# Patient Record
Sex: FEMALE | Race: BLACK OR AFRICAN AMERICAN | Employment: FULL TIME | URBAN - METROPOLITAN AREA
[De-identification: names, ages, dates, MRNs, and addresses within clinical notes are randomized per-mention and may not be internally consistent; named-entity substitution may affect disease eponyms.]

---

## 2017-01-01 ENCOUNTER — HOSPITAL ENCOUNTER (OUTPATIENT)
Dept: ONCOLOGY | Age: 42
Discharge: HOME | End: 2017-12-04

## 2017-01-01 ENCOUNTER — OFFICE VISIT (OUTPATIENT)
Dept: ONCOLOGY | Age: 42
End: 2017-01-01

## 2017-01-01 ENCOUNTER — TELEPHONE (OUTPATIENT)
Dept: ONCOLOGY | Age: 42
End: 2017-01-01

## 2017-01-01 ENCOUNTER — HOSPITAL ENCOUNTER (OUTPATIENT)
Dept: LAB | Age: 42
Discharge: HOME | End: 2017-12-04
Payer: COMMERCIAL

## 2017-01-01 ENCOUNTER — DOCUMENTATION ONLY (OUTPATIENT)
Dept: ONCOLOGY | Age: 42
End: 2017-01-01

## 2017-01-01 VITALS
WEIGHT: 246.2 LBS | HEART RATE: 110 BPM | BODY MASS INDEX: 39.74 KG/M2 | SYSTOLIC BLOOD PRESSURE: 144 MMHG | DIASTOLIC BLOOD PRESSURE: 83 MMHG | TEMPERATURE: 97.8 F

## 2017-01-01 DIAGNOSIS — C50.211 MALIGNANT NEOPLASM OF UPPER-INNER QUADRANT OF RIGHT BREAST IN FEMALE, ESTROGEN RECEPTOR POSITIVE (HCC): ICD-10-CM

## 2017-01-01 DIAGNOSIS — D64.81 ANTINEOPLASTIC CHEMOTHERAPY INDUCED ANEMIA: ICD-10-CM

## 2017-01-01 DIAGNOSIS — T45.1X5A ANTINEOPLASTIC CHEMOTHERAPY INDUCED ANEMIA: ICD-10-CM

## 2017-01-01 DIAGNOSIS — T45.1X5A ANTINEOPLASTIC CHEMOTHERAPY INDUCED ANEMIA: Primary | ICD-10-CM

## 2017-01-01 DIAGNOSIS — Z17.0 MALIGNANT NEOPLASM OF UPPER-INNER QUADRANT OF RIGHT BREAST IN FEMALE, ESTROGEN RECEPTOR POSITIVE (HCC): ICD-10-CM

## 2017-01-01 DIAGNOSIS — D64.81 ANTINEOPLASTIC CHEMOTHERAPY INDUCED ANEMIA: Primary | ICD-10-CM

## 2017-01-01 LAB
ALBUMIN SERPL-MCNC: 3.8 G/DL (ref 3.4–5)
ALBUMIN/GLOB SERPL: 0.7 {RATIO} (ref 0.8–1.7)
ALP SERPL-CCNC: 101 U/L (ref 45–117)
ALT SERPL-CCNC: 20 U/L (ref 13–56)
ANION GAP SERPL CALC-SCNC: 15 MMOL/L (ref 3–18)
AST SERPL-CCNC: 32 U/L (ref 15–37)
BASO+EOS+MONOS # BLD AUTO: 0.4 K/UL (ref 0–2.3)
BASO+EOS+MONOS # BLD AUTO: 5 % (ref 0.1–17)
BILIRUB SERPL-MCNC: 0.4 MG/DL (ref 0.2–1)
BUN SERPL-MCNC: 13 MG/DL (ref 7–18)
BUN/CREAT SERPL: 12 (ref 12–20)
CALCIUM SERPL-MCNC: 9.7 MG/DL (ref 8.5–10.1)
CHLORIDE SERPL-SCNC: 102 MMOL/L (ref 100–108)
CO2 SERPL-SCNC: 22 MMOL/L (ref 21–32)
CREAT SERPL-MCNC: 1.05 MG/DL (ref 0.6–1.3)
DIFFERENTIAL METHOD BLD: ABNORMAL
ERYTHROCYTE [DISTWIDTH] IN BLOOD BY AUTOMATED COUNT: 13.6 % (ref 11.5–14.5)
FERRITIN SERPL-MCNC: 147 NG/ML (ref 8–388)
GLOBULIN SER CALC-MCNC: 5.2 G/DL (ref 2–4)
GLUCOSE SERPL-MCNC: 56 MG/DL (ref 74–99)
HCT VFR BLD AUTO: 45.2 % (ref 36–48)
HGB BLD-MCNC: 15 G/DL (ref 12–16)
IRON SATN MFR SERPL: 12 %
IRON SERPL-MCNC: 45 UG/DL (ref 50–175)
LYMPHOCYTES # BLD: 3.5 K/UL (ref 1.1–5.9)
LYMPHOCYTES NFR BLD: 42 % (ref 14–44)
MCH RBC QN AUTO: 27.7 PG (ref 25–35)
MCHC RBC AUTO-ENTMCNC: 33.2 G/DL (ref 31–37)
MCV RBC AUTO: 83.4 FL (ref 78–102)
NEUTS SEG # BLD: 4.5 K/UL (ref 1.8–9.5)
NEUTS SEG NFR BLD: 54 % (ref 40–70)
PLATELET # BLD AUTO: 198 K/UL (ref 140–440)
POTASSIUM SERPL-SCNC: 3.7 MMOL/L (ref 3.5–5.5)
PROT SERPL-MCNC: 9 G/DL (ref 6.4–8.2)
RBC # BLD AUTO: 5.42 M/UL (ref 4.1–5.1)
SODIUM SERPL-SCNC: 139 MMOL/L (ref 136–145)
TIBC SERPL-MCNC: 390 UG/DL (ref 250–450)
WBC # BLD AUTO: 8.4 K/UL (ref 4.5–13)

## 2017-01-01 PROCEDURE — 80053 COMPREHEN METABOLIC PANEL: CPT | Performed by: NURSE PRACTITIONER

## 2017-01-01 PROCEDURE — 82728 ASSAY OF FERRITIN: CPT | Performed by: NURSE PRACTITIONER

## 2017-01-01 PROCEDURE — 83540 ASSAY OF IRON: CPT | Performed by: NURSE PRACTITIONER

## 2017-01-01 PROCEDURE — 36415 COLL VENOUS BLD VENIPUNCTURE: CPT | Performed by: NURSE PRACTITIONER

## 2017-12-04 PROBLEM — E66.01 OBESITY, MORBID (HCC): Status: ACTIVE | Noted: 2017-01-01

## 2017-12-04 NOTE — PATIENT INSTRUCTIONS
Breast Cancer: Care Instructions  Your Care Instructions    Breast cancer occurs when abnormal cells grow out of control in the breast. These cancer cells can spread within the breast, to nearby lymph nodes and other tissues, and to other parts of the body. Being treated for cancer can weaken your body, and you may feel very tired. Get the rest your body needs so you can feel better. Finding out that you have cancer is scary. You may feel many emotions and may need some help coping. Seek out family, friends, and counselors for support. You also can do things at home to make yourself feel better while you go through treatment. Call the [employer] (8-570.107.2228) or visit its website at [employer] for more information. Follow-up care is a key part of your treatment and safety. Be sure to make and go to all appointments, and call your doctor if you are having problems. It's also a good idea to know your test results and keep a list of the medicines you take. How can you care for yourself at home? · Take your medicines exactly as prescribed. Call your doctor if you think you are having a problem with your medicine. You may get medicine for nausea and vomiting if you have these side effects. · Follow your doctor's instructions to relieve pain. Pain from cancer and surgery can almost always be controlled. Use pain medicine when you first notice pain, before it becomes severe. · Eat healthy food. If you do not feel like eating, try to eat food that has protein and extra calories to keep up your strength and prevent weight loss. Drink liquid meal replacements for extra calories and protein. Try to eat your main meal early. · Get some physical activity every day, but do not get too tired. Keep doing the hobbies you enjoy as your energy allows. · Do not smoke. Smoking can make your cancer worse. If you need help quitting, talk to your doctor about stop-smoking programs and medicines.  These can increase your chances of quitting for good. · Take steps to control your stress and workload. Learn relaxation techniques. ¨ Share your feelings. Stress and tension affect our emotions. By expressing your feelings to others, you may be able to understand and cope with them. ¨ Consider joining a support group. Talking about a problem with your spouse, a good friend, or other people with similar problems is a good way to reduce tension and stress. ¨ Express yourself through art. Try writing, crafts, dance, or art to relieve stress. Some dance, writing, or art groups may be available just for people who have cancer. ¨ Be kind to your body and mind. Getting enough sleep, eating a healthy diet, and taking time to do things you enjoy can contribute to an overall feeling of balance in your life and can help reduce stress. ¨ Get help if you need it. Discuss your concerns with your doctor or counselor. · If you are vomiting or have diarrhea:  ¨ Drink plenty of fluids (enough so that your urine is light yellow or clear like water) to prevent dehydration. Choose water and other caffeine-free clear liquids. If you have kidney, heart, or liver disease and have to limit fluids, talk with your doctor before you increase the amount of fluids you drink. ¨ When you are able to eat, try clear soups, mild foods, and liquids until all symptoms are gone for 12 to 48 hours. Other good choices include dry toast, crackers, cooked cereal, and gelatin dessert, such as Jell-O.  · If you have not already done so, prepare a list of advance directives. Advance directives are instructions to your doctor and family members about what kind of care you want if you become unable to speak or express yourself. When should you call for help? Call 911 anytime you think you may need emergency care. For example, call if:  ? · You passed out (lost consciousness). ?Call your doctor now or seek immediate medical care if:  ? · You have a fever. ? · You have abnormal bleeding. ? · You think you have an infection. ? · You have new or worse pain. ? · You have new symptoms, such as a cough, belly pain, vomiting, diarrhea, or a rash. ? Watch closely for changes in your health, and be sure to contact your doctor if:  ? · You are much more tired than usual.   ? · You have swollen glands in your armpits, groin, or neck. ? · You do not get better as expected. Where can you learn more? Go to http://lalita-emory.info/. Enter V321 in the search box to learn more about \"Breast Cancer: Care Instructions. \"  Current as of: May 12, 2017  Content Version: 11.4  © 0229-6401 [employer]. Care instructions adapted under license by [employer] (which disclaims liability or warranty for this information). If you have questions about a medical condition or this instruction, always ask your healthcare professional. [address] any warranty or liability for your use of this information.

## 2017-12-04 NOTE — PROGRESS NOTES
Hematology/Oncology  Progress Note    Name: Angelita Cummings  Date: 2017  : 1975      Ms. Anjana Prasad is a 38y.o. 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    Current Treatment: Carboplatin/Taxotere, Perjeta completed. Herceptin and Tamoxifen    Subjective:     Ms. Roberto Marie is a 61-year-old woman with a history of invasive adenocarcinoma of the right breast. The patient has completed her neoadjuvant chemotherapy and external beam radiation. She recently underwent bilateral mastectomies and healed well. She recently completed cycle 9 of Herceptin, which she is tolerating well. She continues to take Tamoxifen daily without any side effects. She admits to sometimes missing this pill. She was last seen on 2016 and admits to not adhering to her appointment. She reports that she had a CT scan done 2 months ago and was told to make an appointment here. Otherwise she does not have any complaints or concerns to report at this time. No past medical history on file. Past Surgical History:   Procedure Laterality Date    HX BREAST BIOPSY      HX CHOLECYSTECTOMY      HX TUBAL LIGATION       Social History     Social History    Marital status:      Spouse name: N/A    Number of children: N/A    Years of education: N/A     Occupational History    Not on file.      Social History Main Topics    Smoking status: Never Smoker    Smokeless tobacco: Not on file    Alcohol use No    Drug use: No    Sexual activity: Yes     Other Topics Concern    Not on file     Social History Narrative     Family History   Problem Relation Age of Onset    Cancer Brother     Diabetes Maternal Grandmother     Diabetes Maternal Grandfather     Diabetes Paternal Grandmother     Diabetes Paternal Grandfather      Current Outpatient Prescriptions   Medication Sig Dispense Refill    tamoxifen (NOLVADEX) 20 mg tablet Take 1 Tab by mouth daily. 90 Tab 3    ondansetron hcl (ZOFRAN) 8 mg tablet Take one tablet every 8 hours for nausea beginning the night of chemo for 3 days 9 Tab 6    ibuprofen (MOTRIN) 800 mg tablet Take 1 Tab by mouth every eight (8) hours as needed for Pain. Take medicine with food. 90 Tab 2    diphenoxylate-atropine (LOMOTIL) 2.5-0.025 mg per tablet Take 1 tablet as needed for Diarrhea 30 Tab 3    metroNIDAZOLE (FLAGYL) 500 mg tablet Take 1 tablet daily for 7 days. 7 Tab 0    dronabinol (MARINOL) 5 mg capsule Take 1 Cap by mouth two (2) times a day. Max Daily Amount: 10 mg. 60 Cap 1    fluconazole (DIFLUCAN) 200 mg tablet Take 1 tablet daily for 7 days. 7 Tab 0    aluminum-magnesium hydroxide 200-200 mg/5 mL susp 5 mL, diphenhydrAMINE 12.5 mg/5 mL liqd 12.5 mg, lidocaine 2 % soln 5 mL 5 mL by Swish and Spit route two (2) times a day. Magic mouth wash   Maalox  Lidocaine 2% viscous   Diphenhydramine oral solution     Pharmacy to mix equal portions of ingredients to a total volume as indicated in the dispense amount. 120 mL 2    warfarin (COUMADIN) 1 mg tablet Take one tablet by mouth everyday at 6pm or after. You will continue to take this medication EVERYDAY until port is removed. 30 Tab 6    lidocaine-prilocaine (EMLA) topical cream Apply to mediport 1 hour prior to chemo. Place kitchen saran wrap over cream and mediport. This will numb the site. 30 g 6    prochlorperazine (COMPAZINE) 10 mg tablet Take 1 tablet every hour 6 hour with Benadryl 25mg for nausea for 3 days then, as needed. 60 Tab 6    dexamethasone (DECADRON) 4 mg tablet Take 2 tablets twice a day the day before chemo and the day after chemo. 8 Tab 6    diphenhydrAMINE (BENADRYL ALLERGY) 25 mg tablet Take with 1 compazine every 6 hours for nausea for 3 days then as needed.  60 Tab 6       Review of Systems  General ROS:The patient has no distress or discomfort  Psychological ROS: patient denies having any psychological symptoms such as hallucinations, depression or anxiety. Ophthalmic ROS:the patient complains of left eye swelling with itching and burning  ENT ROS: there are no abnormalities reported. Hematological and Lymphatic ROS: the patient denies having any bruising, bleeding or lymphadenopathy. Endocrine ROS: the patient denies having any heat or cold intolerance. There is no history of diabetes or thyroid disorders. Breast ROS: the patient has pathologically confirmed ER/MI positive invasive ductal adenocarcinoma of the right breast. Her right axillary lymph node biopsy was positive for malignancy as well. Respiratory ROS:the patient denies having any cough, shortness of breath, or dyspnea on exertion. Cardiovascular ROS: there are no complaints of chest pain, palpitations, chest pounding, or dyspnea on exertion. Gastrointestinal ROS: the patient is  having nausea, emesis, diarrhea but denies blood in the stool. Genito-Urinary ROS: the patient denies having urinary urgency, frequency, or dysuria. Musculoskeletal ROS: with the exception of mild arthralgias the patient has no other musculoskeletal complaints. Neurological ROS: the patient denies having any numbness, tingling, or neurologic deficits. Dermatological ROS:patient denies having any unexplained rash, skin ulcerations, or hives. Objective:     Visit Vitals    /83 (BP 1 Location: Right arm, BP Patient Position: Sitting)    Pulse (!) 110    Temp 97.8 °F (36.6 °C) (Oral)    Wt 111.7 kg (246 lb 3.2 oz)    BMI 39.74 kg/m2     ECOG PS=0; Pain score=0/10    Physical Exam:   Gen. Appearance: the patient is in no acute distress. Skin: There is no evidence of bruise or rash. HEENT: The head is normocephalic and atraumatic. The left conjunctiva is slightly swollen without crusting or redness  and sclera are clear. Pupils are equal, round, reactive to light, and accommodation. The extraocular movements are intact. ENT reveals no oral mucosal lesions or ulcerations. Neck: Supple without lymphadenopathy or thyromegaly. Anterior chest wall and breast:  bilateral mastectomy. No palpable axillary mass. Lungs: Clear to auscultation and percussion; there are no wheezes or rhonchi. Heart: Regular rate and rhythm; there are no murmurs, gallops, or rubs. Abdomen: Bowel sounds are present and normal. There is no guarding, tenderness, or hepatosplenomegaly. Extremities: There is no clubbing, cyanosis, or edema. Neurologic: There are no focal neurologic deficits. Lymphatics: There is no palpable peripheral lymphadenopathy. Results for orders placed or performed during the hospital encounter of 12/20/16   CBC WITH 3 PART DIFF   Result Value Ref Range    WBC 5.8 4.5 - 13.0 K/uL    RBC 4.58 4.10 - 5.10 M/uL    HGB 12.5 12.0 - 16.0 g/dL    HCT 39.0 36 - 48 %    MCV 85.2 78 - 102 FL    MCH 27.3 25.0 - 35.0 PG    MCHC 32.1 31 - 37 g/dL    RDW 13.7 11.5 - 14.5 %    PLATELET 242 147 - 514 K/uL    NEUTROPHILS 51 40 - 70 %    MIXED CELLS 4 0.1 - 17 %    LYMPHOCYTES 45 (H) 14 - 44 %    ABS. NEUTROPHILS 3.0 1.8 - 9.5 K/UL    ABS. MIXED CELLS 0.2 0.0 - 2.3 K/uL    ABS. LYMPHOCYTES 2.6 1.1 - 5.9 K/UL    DF AUTOMATED        CBC from today , 7/18/2016 , reveals a WBC count of 5.1, hemoglobin 12.2 g/dL, hematocrit 37.6%, and the platelet count is 502,901. Assessment:     1. Antineoplastic chemotherapy induced anemia    2. Malignant neoplasm of upper-inner quadrant of right breast in female, estrogen receptor positive ([facility] [address]      Plan:     Anemia: The most recent H/H was 12.2g/dl and 37.6%. I will order a ferritin, CMP, and iron profile at this time. Malignant neoplasm of right breast [facility]): the patient has completed her neoadjuvant systemic chemotherapy. She also recently underwent bilateral mastectomy. She reports she completed Herceptin in January of this year. She has completed 9 cycles. She will also continue taking Tamoxifen 20mg daily for 10 years.  I explained the importance of taking Tamoxifen as prescribed. I will order a CA 27-29 level and comprehensive metabolic panel at this time. She will be referred to a lung doctor and urology at this time regarding her CT scan results that was ordered by her PCP. I plan to see the patient here in the clinic in 3 months or sooner if indicated.       Orders Placed This Encounter    COMPLETE CBC & AUTO DIFF WBC    InHouse CBC (Albumatic)     Standing Status:   Future     Number of Occurrences:   1     Standing Expiration Date:   12/11/2017    CA 27.29     Standing Status:   Future     Number of Occurrences:   1     Standing Expiration Date:   12/5/2018    FERRITIN     Standing Status:   Future     Number of Occurrences:   1     Standing Expiration Date:   12/5/2018    IRON PROFILE     Standing Status:   Future     Number of Occurrences:   1     Standing Expiration Date:   50/9/8034    METABOLIC PANEL, COMPREHENSIVE     Standing Status:   Future     Number of Occurrences:   1     Standing Expiration Date:   12/5/2018       Deepti Wang MD  12/04/2017

## 2017-12-04 NOTE — MR AVS SNAPSHOT
Visit Information Date & Time Provider Department Dept. Phone Encounter #  
 12/4/2017  3:15 PM Thierno Christian MD [address] [address] Oncology [phone] [id number] Follow-up Instructions Return in about 3 months (around 3/4/2018). Upcoming Health Maintenance Date Due Pneumococcal 19-64 Highest Risk (1 of 3 - PCV13) 7/25/1996 DTaP/Tdap/Td series (1 - Tdap) 7/25/1998 PAP AKA CERVICAL CYTOLOGY 7/25/1998 Influenza Age 5 to Adult 8/1/2017 Allergies as of 12/4/2017  Review Complete On: 12/20/2016 By: Heather Azar RN Severity Noted Reaction Type Reactions [place]  11/05/2015    Itching Shellfish Derived  11/05/2015    Swelling Current Immunizations  Reviewed on 12/20/2016 No immunizations on file. Not reviewed this visit You Were Diagnosed With   
  
 Codes Comments Antineoplastic chemotherapy induced anemia    -  Primary ICD-10-CM: D64.81, T45.1X5A 
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, estrogen receptor positive ([facility]     ICD-10-CM: C50.211, Z17.0 ICD-9-CM: 174.2, V86.0 Vitals BP Pulse Temp Weight(growth percentile) BMI Smoking Status 144/83 (BP 1 Location: Right arm, BP Patient Position: Sitting) (!) 110 97.8 °F (36.6 °C) (Oral) 246 lb 3.2 oz (111.7 kg) 39.74 kg/m2 Never Smoker Vitals History BMI and BSA Data Body Mass Index Body Surface Area 39.74 kg/m 2 2.28 m 2 Preferred Pharmacy Pharmacy Name Phone [facility] [address] [place] [address] AT [facility] [phone] Your Updated Medication List  
  
   
This list is accurate as of: 12/4/17  4:04 PM.  Always use your most recent med list.  
  
  
  
  
 aluminum-magnesium hydroxide 200-200 mg/5 mL susp 5 mL, diphenhydrAMINE 12.5 mg/5 mL liqd 12.5 mg, lidocaine 2 % soln 5 mL 5 mL by Swish and Spit route two (2) times a day. Magic mouth wash  Maalox Lidocaine 2% viscous  Diphenhydramine oral solution   Pharmacy to mix equal portions of ingredients to a total volume as indicated in the dispense amount. dexamethasone 4 mg tablet Commonly known as:  DECADRON Take 2 tablets twice a day the day before chemo and the day after chemo. diphenhydrAMINE 25 mg tablet Commonly known as:  BENADRYL ALLERGY Take with 1 compazine every 6 hours for nausea for 3 days then as needed. diphenoxylate-atropine 2.5-0.025 mg per tablet Commonly known as:  LOMOTIL Take 1 tablet as needed for Diarrhea  
  
 dronabinol 5 mg capsule Commonly known as:  Nidia [place] Take 1 Cap by mouth two (2) times a day. Max Daily Amount: 10 mg.  
  
 fluconazole 200 mg tablet Commonly known as:  DIFLUCAN Take 1 tablet daily for 7 days. ibuprofen 800 mg tablet Commonly known as:  MOTRIN Take 1 Tab by mouth every eight (8) hours as needed for Pain. Take medicine with food. lidocaine-prilocaine topical cream  
Commonly known as:  EMLA Apply to mediport 1 hour prior to chemo. Place kitchen saran wrap over cream and mediport. This will numb the site. metroNIDAZOLE 500 mg tablet Commonly known as:  FLAGYL Take 1 tablet daily for 7 days. ondansetron hcl 8 mg tablet Commonly known as:  Muriel [place] Take one tablet every 8 hours for nausea beginning the night of chemo for 3 days  
  
 prochlorperazine 10 mg tablet Commonly known as:  COMPAZINE Take 1 tablet every hour 6 hour with Benadryl 25mg for nausea for 3 days then, as needed. tamoxifen 20 mg tablet Commonly known as:  NOLVADEX Take 1 Tab by mouth daily. warfarin 1 mg tablet Commonly known as:  COUMADIN Take one tablet by mouth everyday at 6pm or after. You will continue to take this medication EVERYDAY until port is removed. We Performed the Following COMPLETE CBC & AUTO DIFF WBC [82758 CPT(R)] Follow-up Instructions Return in about 3 months (around 3/4/2018). To-Do List   
 12/04/2017 Lab:  CA 27.29   
  
 12/04/2017 Lab:  CBC WITH 3 PART DIFF   
  
 12/04/2017 Lab:  FERRITIN   
  
 12/04/2017 Lab:  IRON PROFILE   
  
 12/04/2017 Lab:  METABOLIC PANEL, COMPREHENSIVE Patient Instructions Breast Cancer: Care Instructions Your Care Instructions Breast cancer occurs when abnormal cells grow out of control in the breast. These cancer cells can spread within the breast, to nearby lymph nodes and other tissues, and to other parts of the body. Being treated for cancer can weaken your body, and you may feel very tired. Get the rest your body needs so you can feel better. Finding out that you have cancer is scary. You may feel many emotions and may need some help coping. Seek out family, friends, and counselors for support. You also can do things at home to make yourself feel better while you go through treatment. Call the [employer] ([phone]) or visit its website at [address] [employer] for more information. Follow-up care is a key part of your treatment and safety. Be sure to make and go to all appointments, and call your doctor if you are having problems. It's also a good idea to know your test results and keep a list of the medicines you take. How can you care for yourself at home? · Take your medicines exactly as prescribed. Call your doctor if you think you are having a problem with your medicine. You may get medicine for nausea and vomiting if you have these side effects. · Follow your doctor's instructions to relieve pain. Pain from cancer and surgery can almost always be controlled. Use pain medicine when you first notice pain, before it becomes severe. · Eat healthy food.  If you do not feel like eating, try to eat food that has protein and extra calories to keep up your strength and prevent weight loss. Drink liquid meal replacements for extra calories and protein. Try to eat your main meal early. · Get some physical activity every day, but do not get too tired. Keep doing the hobbies you enjoy as your energy allows. · Do not smoke. Smoking can make your cancer worse. If you need help quitting, talk to your doctor about stop-smoking programs and medicines. These can increase your chances of quitting for good. · Take steps to control your stress and workload. Learn relaxation techniques. ¨ Share your feelings. Stress and tension affect our emotions. By expressing your feelings to others, you may be able to understand and cope with them. ¨ Consider joining a support group. Talking about a problem with your spouse, a good friend, or other people with similar problems is a good way to reduce tension and stress. ¨ Express yourself through art. Try writing, crafts, dance, or art to relieve stress. Some dance, writing, or art groups may be available just for people who have cancer. ¨ Be kind to your body and mind. Getting enough sleep, eating a healthy diet, and taking time to do things you enjoy can contribute to an overall feeling of balance in your life and can help reduce stress. ¨ Get help if you need it. Discuss your concerns with your doctor or counselor. · If you are vomiting or have diarrhea: ¨ Drink plenty of fluids (enough so that your urine is light yellow or clear like water) to prevent dehydration. Choose water and other caffeine-free clear liquids. If you have kidney, heart, or liver disease and have to limit fluids, talk with your doctor before you increase the amount of fluids you drink. ¨ When you are able to eat, try clear soups, mild foods, and liquids until all symptoms are gone for 12 to 48 hours. Other good choices include dry toast, crackers, cooked cereal, and gelatin dessert, such as Jell-O. · If you have not already done so, prepare a list of advance directives. Advance directives are instructions to your doctor and family members about what kind of care you want if you become unable to speak or express yourself. When should you call for help? Call 911 anytime you think you may need emergency care. For example, call if: 
? · You passed out (lost consciousness). ?Call your doctor now or seek immediate medical care if: 
? · You have a fever. ? · You have abnormal bleeding. ? · You think you have an infection. ? · You have new or worse pain. ? · You have new symptoms, such as a cough, belly pain, vomiting, diarrhea, or a rash. ? Watch closely for changes in your health, and be sure to contact your doctor if: 
? · You are much more tired than usual.  
? · You have swollen glands in your armpits, groin, or neck. ? · You do not get better as expected. Where can you learn more? Go to http://lalita-emory.info/. Enter V321 in the search box to learn more about \"Breast Cancer: Care Instructions. \" Current as of: May 12, 2017 Content Version: 11.4 © 5686-6747 [employer]. Care instructions adapted under license by [employer] (which disclaims liability or warranty for this information). If you have questions about a medical condition or this instruction, always ask your healthcare professional. [place] [address] any warranty or liability for your use of this information. Introducing [facility]! [facility] introduces [employer] patient portal. Now you can access parts of your medical record, email your doctor's office, and request medication refills online. 1. In your internet browser, go to [email or web address] 2. Click on the First Time User? Click Here link in the Sign In box. You will see the New Member Sign Up page. 3. Enter your [employer] Access Code exactly as it appears below.  You will not need to use this code after youve completed the sign-up process. If you do not sign up before the expiration date, you must request a new code. · [facility] Access Code: [email or web address] Expires: 12/26/2017  9:59 AM 
 
4. Enter the last four digits of your Social Security Number (xxxx) and Date of Birth (mm/dd/yyyy) as indicated and click Submit. You will be taken to the next sign-up page. 5. Create a [facility] ID. This will be your [facility] login ID and cannot be changed, so think of one that is secure and easy to remember. 6. Create a [facility] password. You can change your password at any time. 7. Enter your Password Reset Question and Answer. This can be used at a later time if you forget your password. 8. Enter your e-mail address. You will receive e-mail notification when new information is available in [address] 9. Click Sign Up. You can now view and download portions of your medical record. 10. Click the Download Summary menu link to download a portable copy of your medical information. If you have questions, please visit the Frequently Asked Questions section of the [facility] website. Remember, [facility] is NOT to be used for urgent needs. For medical emergencies, dial 911. Now available from your iPhone and Android! Please provide this summary of care documentation to your next provider. Your primary care clinician is listed as Bindu Medina. If you have any questions after today's visit, please call [phone].

## 2017-12-04 NOTE — LETTER
NOTIFICATION RETURN TO WORK / SCHOOL 
 
12/4/2017 4:04 PM 
 
Ms. Omid Martin [address] [facility] [address] [address] [address] To Whom It May Concern: 
 
Omid Martin is currently under the care of [address] [place] [address]. She will return to work/school on: 12/5/17 If there are questions or concerns please have the patient contact our office. Sincerely, Robson Sanchez MD

## 2017-12-05 NOTE — PROGRESS NOTES
Called pt to inform her of upcoming appointments with pulmonologist at [facility] (on [address] 12/14/17 at 2:00pm) and with urologist Dr. Hilda Cantor (on 1/2/18 at 2:30 pm). Pt's phone was disconnected.

## 2017-12-12 NOTE — PROGRESS NOTES
Called pt and left voicemail about scheduled urology appt. Told pt that we already scheduled appt and that urology office gave her the next available. Also, told pt that she could call urology office and see if she can r/s her appt to something sooner.

## 2018-01-01 ENCOUNTER — HOSPITAL ENCOUNTER (OUTPATIENT)
Dept: LAB | Age: 43
Discharge: HOME | End: 2018-01-26
Payer: COMMERCIAL

## 2018-01-01 ENCOUNTER — APPOINTMENT (OUTPATIENT)
Dept: INFUSION THERAPY | Age: 43
End: 2018-01-01

## 2018-01-01 ENCOUNTER — APPOINTMENT (OUTPATIENT)
Dept: INFUSION THERAPY | Age: 43
End: 2018-01-01
Payer: COMMERCIAL

## 2018-01-01 ENCOUNTER — TELEPHONE (OUTPATIENT)
Dept: ONCOLOGY | Age: 43
End: 2018-01-01

## 2018-01-01 ENCOUNTER — HOSPITAL ENCOUNTER (OUTPATIENT)
Dept: INFUSION THERAPY | Age: 43
Discharge: HOME | End: 2018-02-07
Payer: COMMERCIAL

## 2018-01-01 ENCOUNTER — OFFICE VISIT (OUTPATIENT)
Dept: ONCOLOGY | Age: 43
End: 2018-01-01

## 2018-01-01 ENCOUNTER — HOSPITAL ENCOUNTER (OUTPATIENT)
Dept: LAB | Age: 43
Discharge: HOME | End: 2018-02-05

## 2018-01-01 ENCOUNTER — ANTI-COAG VISIT (OUTPATIENT)
Dept: ONCOLOGY | Age: 43
End: 2018-01-01

## 2018-01-01 ENCOUNTER — HOSPITAL ENCOUNTER (OUTPATIENT)
Dept: INFUSION THERAPY | Age: 43
Discharge: HOME | End: 2018-05-01
Payer: COMMERCIAL

## 2018-01-01 ENCOUNTER — HOSPITAL ENCOUNTER (OUTPATIENT)
Dept: INFUSION THERAPY | Age: 43
Discharge: HOME | End: 2018-01-31
Payer: COMMERCIAL

## 2018-01-01 ENCOUNTER — HOSPITAL ENCOUNTER (OUTPATIENT)
Dept: INFUSION THERAPY | Age: 43
Discharge: HOME | End: 2018-03-20
Payer: COMMERCIAL

## 2018-01-01 ENCOUNTER — HOSPITAL ENCOUNTER (OUTPATIENT)
Dept: INFUSION THERAPY | Age: 43
End: 2018-01-01
Payer: COMMERCIAL

## 2018-01-01 ENCOUNTER — HOSPITAL ENCOUNTER (OUTPATIENT)
Dept: ONCOLOGY | Age: 43
Discharge: HOME | End: 2018-01-26

## 2018-01-01 ENCOUNTER — HOSPITAL ENCOUNTER (OUTPATIENT)
Dept: INFUSION THERAPY | Age: 43
Discharge: HOME | End: 2018-05-15
Payer: COMMERCIAL

## 2018-01-01 ENCOUNTER — DOCUMENTATION ONLY (OUTPATIENT)
Dept: ONCOLOGY | Age: 43
End: 2018-01-01

## 2018-01-01 ENCOUNTER — HOSPITAL ENCOUNTER (OUTPATIENT)
Dept: INFUSION THERAPY | Age: 43
End: 2018-01-01

## 2018-01-01 ENCOUNTER — HOSPITAL ENCOUNTER (OUTPATIENT)
Dept: ONCOLOGY | Age: 43
Discharge: HOME | End: 2018-05-14

## 2018-01-01 ENCOUNTER — HOSPITAL ENCOUNTER (OUTPATIENT)
Dept: INFUSION THERAPY | Age: 43
Discharge: HOME | End: 2018-03-15
Payer: COMMERCIAL

## 2018-01-01 ENCOUNTER — HOSPITAL ENCOUNTER (OUTPATIENT)
Dept: INFUSION THERAPY | Age: 43
Discharge: HOME | End: 2018-02-22
Payer: COMMERCIAL

## 2018-01-01 ENCOUNTER — HOSPITAL ENCOUNTER (OUTPATIENT)
Dept: ONCOLOGY | Age: 43
Discharge: HOME | End: 2018-06-11

## 2018-01-01 ENCOUNTER — HOSPITAL ENCOUNTER (OUTPATIENT)
Dept: INFUSION THERAPY | Age: 43
Discharge: HOME | End: 2018-04-10
Payer: COMMERCIAL

## 2018-01-01 VITALS
WEIGHT: 221.2 LBS | DIASTOLIC BLOOD PRESSURE: 81 MMHG | HEIGHT: 66 IN | SYSTOLIC BLOOD PRESSURE: 130 MMHG | RESPIRATION RATE: 18 BRPM | HEART RATE: 100 BPM | BODY MASS INDEX: 35.55 KG/M2 | TEMPERATURE: 98 F | OXYGEN SATURATION: 97 %

## 2018-01-01 VITALS
DIASTOLIC BLOOD PRESSURE: 63 MMHG | TEMPERATURE: 98.2 F | SYSTOLIC BLOOD PRESSURE: 96 MMHG | HEART RATE: 101 BPM | BODY MASS INDEX: 35.19 KG/M2 | WEIGHT: 218 LBS

## 2018-01-01 VITALS
DIASTOLIC BLOOD PRESSURE: 77 MMHG | WEIGHT: 226 LBS | TEMPERATURE: 97.9 F | HEART RATE: 123 BPM | SYSTOLIC BLOOD PRESSURE: 113 MMHG | BODY MASS INDEX: 36.48 KG/M2

## 2018-01-01 VITALS
HEART RATE: 100 BPM | HEIGHT: 66 IN | BODY MASS INDEX: 34.72 KG/M2 | SYSTOLIC BLOOD PRESSURE: 116 MMHG | DIASTOLIC BLOOD PRESSURE: 82 MMHG | TEMPERATURE: 98.4 F | WEIGHT: 216 LBS

## 2018-01-01 VITALS
OXYGEN SATURATION: 97 % | HEIGHT: 66 IN | DIASTOLIC BLOOD PRESSURE: 68 MMHG | HEART RATE: 96 BPM | SYSTOLIC BLOOD PRESSURE: 89 MMHG | WEIGHT: 218.5 LBS | TEMPERATURE: 97.6 F | BODY MASS INDEX: 35.12 KG/M2 | RESPIRATION RATE: 16 BRPM

## 2018-01-01 VITALS
WEIGHT: 218 LBS | OXYGEN SATURATION: 98 % | TEMPERATURE: 97.6 F | HEART RATE: 89 BPM | DIASTOLIC BLOOD PRESSURE: 68 MMHG | RESPIRATION RATE: 18 BRPM | BODY MASS INDEX: 35.03 KG/M2 | HEIGHT: 66 IN | SYSTOLIC BLOOD PRESSURE: 96 MMHG

## 2018-01-01 VITALS
HEART RATE: 122 BPM | HEIGHT: 66 IN | TEMPERATURE: 98.1 F | BODY MASS INDEX: 32.23 KG/M2 | WEIGHT: 200.56 LBS | RESPIRATION RATE: 20 BRPM | OXYGEN SATURATION: 99 % | SYSTOLIC BLOOD PRESSURE: 118 MMHG | DIASTOLIC BLOOD PRESSURE: 85 MMHG

## 2018-01-01 VITALS
HEIGHT: 66 IN | HEART RATE: 96 BPM | TEMPERATURE: 98.1 F | WEIGHT: 215.8 LBS | DIASTOLIC BLOOD PRESSURE: 70 MMHG | RESPIRATION RATE: 18 BRPM | OXYGEN SATURATION: 95 % | BODY MASS INDEX: 34.68 KG/M2 | SYSTOLIC BLOOD PRESSURE: 101 MMHG

## 2018-01-01 VITALS — SYSTOLIC BLOOD PRESSURE: 129 MMHG | TEMPERATURE: 98.7 F | HEART RATE: 147 BPM | DIASTOLIC BLOOD PRESSURE: 85 MMHG

## 2018-01-01 VITALS
SYSTOLIC BLOOD PRESSURE: 107 MMHG | DIASTOLIC BLOOD PRESSURE: 74 MMHG | BODY MASS INDEX: 34.86 KG/M2 | HEART RATE: 96 BPM | WEIGHT: 216 LBS | TEMPERATURE: 98.3 F

## 2018-01-01 DIAGNOSIS — G89.3 CANCER ASSOCIATED PAIN: ICD-10-CM

## 2018-01-01 DIAGNOSIS — M89.8X9 BONE PAIN DUE TO GRANULOCYTE COLONY STIMULATING FACTOR: ICD-10-CM

## 2018-01-01 DIAGNOSIS — C50.919 CARCINOMA OF BREAST METASTATIC TO BONE, UNSPECIFIED LATERALITY (HCC): Primary | ICD-10-CM

## 2018-01-01 DIAGNOSIS — I26.99 BILATERAL PULMONARY EMBOLISM (HCC): ICD-10-CM

## 2018-01-01 DIAGNOSIS — T45.1X5A ANTINEOPLASTIC CHEMOTHERAPY INDUCED ANEMIA: ICD-10-CM

## 2018-01-01 DIAGNOSIS — C50.919 METASTATIC BREAST CANCER (HCC): ICD-10-CM

## 2018-01-01 DIAGNOSIS — F11.90 NARCOTIC DRUG USE: ICD-10-CM

## 2018-01-01 DIAGNOSIS — D64.81 ANTINEOPLASTIC CHEMOTHERAPY INDUCED ANEMIA: ICD-10-CM

## 2018-01-01 DIAGNOSIS — C50.919 CARCINOMA OF BREAST METASTATIC TO BONE, UNSPECIFIED LATERALITY (HCC): ICD-10-CM

## 2018-01-01 DIAGNOSIS — C79.51 CARCINOMA OF BREAST METASTATIC TO BONE, UNSPECIFIED LATERALITY (HCC): ICD-10-CM

## 2018-01-01 DIAGNOSIS — C50.919 METASTATIC BREAST CANCER (HCC): Primary | ICD-10-CM

## 2018-01-01 DIAGNOSIS — C79.51 CARCINOMA OF BREAST METASTATIC TO BONE, UNSPECIFIED LATERALITY (HCC): Primary | ICD-10-CM

## 2018-01-01 DIAGNOSIS — I26.99 BILATERAL PULMONARY EMBOLISM (HCC): Primary | ICD-10-CM

## 2018-01-01 DIAGNOSIS — F41.9 ANXIETY: ICD-10-CM

## 2018-01-01 DIAGNOSIS — J91.0 MALIGNANT PLEURAL EFFUSION: ICD-10-CM

## 2018-01-01 DIAGNOSIS — F41.9 ANXIETY: Primary | ICD-10-CM

## 2018-01-01 LAB
ALBUMIN SERPL-MCNC: 2.3 G/DL (ref 3.4–5)
ALBUMIN SERPL-MCNC: 2.6 G/DL (ref 3.4–5)
ALBUMIN SERPL-MCNC: 2.6 G/DL (ref 3.4–5)
ALBUMIN SERPL-MCNC: 2.9 G/DL (ref 3.4–5)
ALBUMIN SERPL-MCNC: 3 G/DL (ref 3.4–5)
ALBUMIN/GLOB SERPL: 0.6 {RATIO} (ref 0.8–1.7)
ALBUMIN/GLOB SERPL: 0.7 {RATIO} (ref 0.8–1.7)
ALBUMIN/GLOB SERPL: 0.8 {RATIO} (ref 0.8–1.7)
ALBUMIN/GLOB SERPL: 0.9 {RATIO} (ref 0.8–1.7)
ALBUMIN/GLOB SERPL: 1 {RATIO} (ref 0.8–1.7)
ALP SERPL-CCNC: 116 U/L (ref 45–117)
ALP SERPL-CCNC: 147 U/L (ref 45–117)
ALP SERPL-CCNC: 174 U/L (ref 45–117)
ALP SERPL-CCNC: 218 U/L (ref 45–117)
ALP SERPL-CCNC: 249 U/L (ref 45–117)
ALT SERPL-CCNC: 13 U/L (ref 13–56)
ALT SERPL-CCNC: 15 U/L (ref 13–56)
ALT SERPL-CCNC: 18 U/L (ref 13–56)
ALT SERPL-CCNC: 41 U/L (ref 13–56)
ALT SERPL-CCNC: 55 U/L (ref 13–56)
ANION GAP BLD CALC-SCNC: 15 MMOL/L (ref 10–20)
ANION GAP BLD CALC-SCNC: 15 MMOL/L (ref 10–20)
ANION GAP BLD CALC-SCNC: 16 MMOL/L (ref 10–20)
ANION GAP BLD CALC-SCNC: 16 MMOL/L (ref 10–20)
ANION GAP BLD CALC-SCNC: 18 MMOL/L (ref 10–20)
ANION GAP SERPL CALC-SCNC: 12 MMOL/L (ref 3–18)
AST SERPL-CCNC: 19 U/L (ref 15–37)
AST SERPL-CCNC: 26 U/L (ref 15–37)
AST SERPL-CCNC: 28 U/L (ref 15–37)
AST SERPL-CCNC: 29 U/L (ref 15–37)
AST SERPL-CCNC: 32 U/L (ref 15–37)
BASO+EOS+MONOS # BLD AUTO: 0.2 K/UL (ref 0–2.3)
BASO+EOS+MONOS # BLD AUTO: 0.2 K/UL (ref 0–2.3)
BASO+EOS+MONOS # BLD AUTO: 0.3 K/UL (ref 0–2.3)
BASO+EOS+MONOS # BLD AUTO: 0.4 K/UL (ref 0–2.3)
BASO+EOS+MONOS # BLD AUTO: 0.5 K/UL (ref 0–2.3)
BASO+EOS+MONOS # BLD AUTO: 0.7 K/UL (ref 0–2.3)
BASO+EOS+MONOS # BLD AUTO: 12 % (ref 0.1–17)
BASO+EOS+MONOS # BLD AUTO: 3 % (ref 0.1–17)
BASO+EOS+MONOS # BLD AUTO: 4 % (ref 0.1–17)
BASO+EOS+MONOS # BLD AUTO: 5 % (ref 0.1–17)
BASO+EOS+MONOS # BLD AUTO: 7 % (ref 0.1–17)
BASO+EOS+MONOS # BLD AUTO: 9 % (ref 0.1–17)
BILIRUB DIRECT SERPL-MCNC: 0.2 MG/DL (ref 0–0.2)
BILIRUB DIRECT SERPL-MCNC: 0.3 MG/DL (ref 0–0.2)
BILIRUB SERPL-MCNC: 0.4 MG/DL (ref 0.2–1)
BILIRUB SERPL-MCNC: 0.5 MG/DL (ref 0.2–1)
BILIRUB SERPL-MCNC: 0.5 MG/DL (ref 0.2–1)
BILIRUB SERPL-MCNC: 0.6 MG/DL (ref 0.2–1)
BILIRUB SERPL-MCNC: 0.7 MG/DL (ref 0.2–1)
BUN BLD-MCNC: 11 MG/DL (ref 7–18)
BUN BLD-MCNC: 17 MG/DL (ref 7–18)
BUN BLD-MCNC: 7 MG/DL (ref 7–18)
BUN SERPL-MCNC: 24 MG/DL (ref 7–18)
BUN/CREAT SERPL: 23 (ref 12–20)
CA-I BLD-MCNC: 1.09 MMOL/L (ref 1.12–1.32)
CA-I BLD-MCNC: 1.11 MMOL/L (ref 1.12–1.32)
CA-I BLD-MCNC: 1.14 MMOL/L (ref 1.12–1.32)
CA-I BLD-MCNC: 1.2 MMOL/L (ref 1.12–1.32)
CA-I BLD-MCNC: 1.33 MMOL/L (ref 1.12–1.32)
CALCIUM SERPL-MCNC: 8.6 MG/DL (ref 8.5–10.1)
CANCER AG27-29 SERPL-ACNC: 6878.3 U/ML (ref 0–38.6)
CHLORIDE BLD-SCNC: 100 MMOL/L (ref 100–108)
CHLORIDE BLD-SCNC: 102 MMOL/L (ref 100–108)
CHLORIDE BLD-SCNC: 104 MMOL/L (ref 100–108)
CHLORIDE BLD-SCNC: 105 MMOL/L (ref 100–108)
CHLORIDE BLD-SCNC: 98 MMOL/L (ref 100–108)
CHLORIDE SERPL-SCNC: 103 MMOL/L (ref 100–108)
CO2 BLD-SCNC: 24 MMOL/L (ref 19–24)
CO2 BLD-SCNC: 25 MMOL/L (ref 19–24)
CO2 BLD-SCNC: 28 MMOL/L (ref 19–24)
CO2 SERPL-SCNC: 21 MMOL/L (ref 21–32)
CREAT SERPL-MCNC: 1.05 MG/DL (ref 0.6–1.3)
CREAT UR-MCNC: 0.8 MG/DL (ref 0.6–1.3)
CREAT UR-MCNC: 0.8 MG/DL (ref 0.6–1.3)
CREAT UR-MCNC: 0.9 MG/DL (ref 0.6–1.3)
CREAT UR-MCNC: 1.6 MG/DL (ref 0.6–1.3)
CREAT UR-MCNC: 2.2 MG/DL (ref 0.6–1.3)
DIFFERENTIAL METHOD BLD: ABNORMAL
ERYTHROCYTE [DISTWIDTH] IN BLOOD BY AUTOMATED COUNT: 16.5 % (ref 11.5–14.5)
ERYTHROCYTE [DISTWIDTH] IN BLOOD BY AUTOMATED COUNT: 16.6 % (ref 11.5–14.5)
ERYTHROCYTE [DISTWIDTH] IN BLOOD BY AUTOMATED COUNT: 17 % (ref 11.5–14.5)
ERYTHROCYTE [DISTWIDTH] IN BLOOD BY AUTOMATED COUNT: 17.1 % (ref 11.5–14.5)
ERYTHROCYTE [DISTWIDTH] IN BLOOD BY AUTOMATED COUNT: 18 % (ref 11.5–14.5)
ERYTHROCYTE [DISTWIDTH] IN BLOOD BY AUTOMATED COUNT: 19.9 % (ref 11.5–14.5)
GLOBULIN SER CALC-MCNC: 3.1 G/DL (ref 2–4)
GLOBULIN SER CALC-MCNC: 3.3 G/DL (ref 2–4)
GLOBULIN SER CALC-MCNC: 3.3 G/DL (ref 2–4)
GLOBULIN SER CALC-MCNC: 3.5 G/DL (ref 2–4)
GLOBULIN SER CALC-MCNC: 3.7 G/DL (ref 2–4)
GLUCOSE BLD STRIP.AUTO-MCNC: 109 MG/DL (ref 74–106)
GLUCOSE BLD STRIP.AUTO-MCNC: 166 MG/DL (ref 74–106)
GLUCOSE BLD STRIP.AUTO-MCNC: 405 MG/DL (ref 74–106)
GLUCOSE BLD STRIP.AUTO-MCNC: 80 MG/DL (ref 74–106)
GLUCOSE BLD STRIP.AUTO-MCNC: 89 MG/DL (ref 74–106)
GLUCOSE SERPL-MCNC: 319 MG/DL (ref 74–99)
HAV IGM SER QL: NEGATIVE
HBV CORE IGM SER QL: NEGATIVE
HBV SURFACE AG SER QL: <0.1 INDEX
HBV SURFACE AG SER QL: NEGATIVE
HCT VFR BLD AUTO: 26.6 % (ref 36–48)
HCT VFR BLD AUTO: 31.9 % (ref 36–48)
HCT VFR BLD AUTO: 34.9 % (ref 36–48)
HCT VFR BLD AUTO: 35.4 % (ref 36–48)
HCT VFR BLD AUTO: 35.8 % (ref 36–48)
HCT VFR BLD AUTO: 39.6 % (ref 36–48)
HCT VFR BLD CALC: 23 % (ref 36–49)
HCT VFR BLD CALC: 31 % (ref 36–49)
HCT VFR BLD CALC: 35 % (ref 36–49)
HCT VFR BLD CALC: 35 % (ref 36–49)
HCT VFR BLD CALC: 37 % (ref 36–49)
HCV AB SER IA-ACNC: 0.1 INDEX
HCV AB SERPL QL IA: NEGATIVE
HCV COMMENT,HCGAC: NORMAL
HGB BLD-MCNC: 10.5 G/DL (ref 12–16)
HGB BLD-MCNC: 10.9 G/DL (ref 12–16)
HGB BLD-MCNC: 11 G/DL (ref 12–16)
HGB BLD-MCNC: 11.9 G/DL (ref 12–16)
HGB BLD-MCNC: 11.9 G/DL (ref 12–16)
HGB BLD-MCNC: 12.1 G/DL (ref 12–16)
HGB BLD-MCNC: 12.6 G/DL (ref 12–16)
HGB BLD-MCNC: 13.1 G/DL (ref 12–16)
HGB BLD-MCNC: 7.8 G/DL (ref 12–16)
HGB BLD-MCNC: 8.5 G/DL (ref 12–16)
HGB BLD-MCNC: 9.8 G/DL (ref 12–16)
INR BLD: 1.5 (ref 2–3)
INR BLD: 5.3 (ref 2–3)
INR BLD: 7 (ref 2–3)
INR PPP: 1.3 (ref 0.8–1.2)
LYMPHOCYTES # BLD: 0.5 K/UL (ref 1.1–5.9)
LYMPHOCYTES # BLD: 0.6 K/UL (ref 1.1–5.9)
LYMPHOCYTES # BLD: 0.7 K/UL (ref 1.1–5.9)
LYMPHOCYTES # BLD: 0.8 K/UL (ref 1.1–5.9)
LYMPHOCYTES # BLD: 1.1 K/UL (ref 1.1–5.9)
LYMPHOCYTES # BLD: 1.1 K/UL (ref 1.1–5.9)
LYMPHOCYTES NFR BLD: 11 % (ref 14–44)
LYMPHOCYTES NFR BLD: 11 % (ref 14–44)
LYMPHOCYTES NFR BLD: 17 % (ref 14–44)
LYMPHOCYTES NFR BLD: 18 % (ref 14–44)
LYMPHOCYTES NFR BLD: 20 % (ref 14–44)
LYMPHOCYTES NFR BLD: 6 % (ref 14–44)
MAGNESIUM SERPL-MCNC: 1 MG/DL (ref 1.6–2.6)
MAGNESIUM SERPL-MCNC: 1.3 MG/DL (ref 1.6–2.6)
MCH RBC QN AUTO: 26.6 PG (ref 25–35)
MCH RBC QN AUTO: 27.3 PG (ref 25–35)
MCH RBC QN AUTO: 27.3 PG (ref 25–35)
MCH RBC QN AUTO: 27.5 PG (ref 25–35)
MCH RBC QN AUTO: 28.2 PG (ref 25–35)
MCH RBC QN AUTO: 28.5 PG (ref 25–35)
MCHC RBC AUTO-ENTMCNC: 30.7 G/DL (ref 31–37)
MCHC RBC AUTO-ENTMCNC: 30.8 G/DL (ref 31–37)
MCHC RBC AUTO-ENTMCNC: 31.5 G/DL (ref 31–37)
MCHC RBC AUTO-ENTMCNC: 32 G/DL (ref 31–37)
MCHC RBC AUTO-ENTMCNC: 33.1 G/DL (ref 31–37)
MCHC RBC AUTO-ENTMCNC: 33.8 G/DL (ref 31–37)
MCV RBC AUTO: 82.5 FL (ref 78–102)
MCV RBC AUTO: 84.2 FL (ref 78–102)
MCV RBC AUTO: 84.5 FL (ref 78–102)
MCV RBC AUTO: 86.1 FL (ref 78–102)
MCV RBC AUTO: 88.5 FL (ref 78–102)
MCV RBC AUTO: 91.9 FL (ref 78–102)
NEUTS SEG # BLD: 3.8 K/UL (ref 1.8–9.5)
NEUTS SEG # BLD: 3.8 K/UL (ref 1.8–9.5)
NEUTS SEG # BLD: 4.4 K/UL (ref 1.8–9.5)
NEUTS SEG # BLD: 4.6 K/UL (ref 1.8–9.5)
NEUTS SEG # BLD: 4.8 K/UL (ref 1.8–9.5)
NEUTS SEG # BLD: 8.5 K/UL (ref 1.8–9.5)
NEUTS SEG NFR BLD: 71 % (ref 40–70)
NEUTS SEG NFR BLD: 75 % (ref 40–70)
NEUTS SEG NFR BLD: 78 % (ref 40–70)
NEUTS SEG NFR BLD: 79 % (ref 40–70)
NEUTS SEG NFR BLD: 85 % (ref 40–70)
NEUTS SEG NFR BLD: 92 % (ref 40–70)
PLATELET # BLD AUTO: 163 K/UL (ref 140–440)
PLATELET # BLD AUTO: 298 K/UL (ref 140–440)
PLATELET # BLD AUTO: 305 K/UL (ref 140–440)
PLATELET # BLD AUTO: 336 K/UL (ref 140–440)
PLATELET # BLD AUTO: 67 K/UL (ref 140–440)
PLATELET # BLD AUTO: 71 K/UL (ref 140–440)
POTASSIUM BLD-SCNC: 3.3 MMOL/L (ref 3.5–5.5)
POTASSIUM BLD-SCNC: 3.4 MMOL/L (ref 3.5–5.5)
POTASSIUM BLD-SCNC: 3.7 MMOL/L (ref 3.5–5.5)
POTASSIUM BLD-SCNC: 4 MMOL/L (ref 3.5–5.5)
POTASSIUM BLD-SCNC: 4.2 MMOL/L (ref 3.5–5.5)
POTASSIUM SERPL-SCNC: 3.7 MMOL/L (ref 3.5–5.5)
PROT SERPL-MCNC: 5.9 G/DL (ref 6.4–8.2)
PROT SERPL-MCNC: 6 G/DL (ref 6.4–8.2)
PROT SERPL-MCNC: 6.1 G/DL (ref 6.4–8.2)
PROT SERPL-MCNC: 6.1 G/DL (ref 6.4–8.2)
PROT SERPL-MCNC: 6.2 G/DL (ref 6.4–8.2)
PROTHROMBIN TIME: 15.5 SEC (ref 11.5–15.2)
RBC # BLD AUTO: 3.09 M/UL (ref 4.1–5.1)
RBC # BLD AUTO: 3.47 M/UL (ref 4.1–5.1)
RBC # BLD AUTO: 4 M/UL (ref 4.1–5.1)
RBC # BLD AUTO: 4.13 M/UL (ref 4.1–5.1)
RBC # BLD AUTO: 4.25 M/UL (ref 4.1–5.1)
RBC # BLD AUTO: 4.8 M/UL (ref 4.1–5.1)
SODIUM BLD-SCNC: 136 MMOL/L (ref 136–145)
SODIUM BLD-SCNC: 136 MMOL/L (ref 136–145)
SODIUM BLD-SCNC: 139 MMOL/L (ref 136–145)
SODIUM BLD-SCNC: 139 MMOL/L (ref 136–145)
SODIUM BLD-SCNC: 141 MMOL/L (ref 136–145)
SODIUM SERPL-SCNC: 136 MMOL/L (ref 136–145)
SP1: NORMAL
SP2: NORMAL
SP3: NORMAL
TSH SERPL DL<=0.05 MIU/L-ACNC: 0.47 UIU/ML (ref 0.36–3.74)
TSH SERPL DL<=0.05 MIU/L-ACNC: 1.91 UIU/ML (ref 0.36–3.74)
VALID INTERNAL CONTROL?: YES
WBC # BLD AUTO: 4.8 K/UL (ref 4.5–13)
WBC # BLD AUTO: 5.4 K/UL (ref 4.5–13)
WBC # BLD AUTO: 5.5 K/UL (ref 4.5–13)
WBC # BLD AUTO: 5.9 K/UL (ref 4.5–13)
WBC # BLD AUTO: 6.2 K/UL (ref 4.5–13)
WBC # BLD AUTO: 9.2 K/UL (ref 4.5–13)

## 2018-01-01 PROCEDURE — 77030012965 HC NDL HUBR BBMI -A

## 2018-01-01 PROCEDURE — 74011250636 HC RX REV CODE- 250/636: Performed by: INTERNAL MEDICINE

## 2018-01-01 PROCEDURE — 74011250636 HC RX REV CODE- 250/636: Performed by: NURSE PRACTITIONER

## 2018-01-01 PROCEDURE — 80074 ACUTE HEPATITIS PANEL: CPT | Performed by: INTERNAL MEDICINE

## 2018-01-01 PROCEDURE — 83735 ASSAY OF MAGNESIUM: CPT | Performed by: INTERNAL MEDICINE

## 2018-01-01 PROCEDURE — 74011000258 HC RX REV CODE- 258: Performed by: INTERNAL MEDICINE

## 2018-01-01 PROCEDURE — 80076 HEPATIC FUNCTION PANEL: CPT | Performed by: INTERNAL MEDICINE

## 2018-01-01 PROCEDURE — 86300 IMMUNOASSAY TUMOR CA 15-3: CPT | Performed by: INTERNAL MEDICINE

## 2018-01-01 PROCEDURE — 85610 PROTHROMBIN TIME: CPT | Performed by: INTERNAL MEDICINE

## 2018-01-01 PROCEDURE — 85025 COMPLETE CBC W/AUTO DIFF WBC: CPT | Performed by: INTERNAL MEDICINE

## 2018-01-01 PROCEDURE — 80047 BASIC METABLC PNL IONIZED CA: CPT

## 2018-01-01 PROCEDURE — 36415 COLL VENOUS BLD VENIPUNCTURE: CPT | Performed by: INTERNAL MEDICINE

## 2018-01-01 PROCEDURE — 74011000250 HC RX REV CODE- 250: Performed by: NURSE PRACTITIONER

## 2018-01-01 PROCEDURE — 96413 CHEMO IV INFUSION 1 HR: CPT

## 2018-01-01 PROCEDURE — 84443 ASSAY THYROID STIM HORMONE: CPT | Performed by: INTERNAL MEDICINE

## 2018-01-01 PROCEDURE — 96375 TX/PRO/DX INJ NEW DRUG ADDON: CPT

## 2018-01-01 PROCEDURE — 96409 CHEMO IV PUSH SNGL DRUG: CPT

## 2018-01-01 PROCEDURE — 80053 COMPREHEN METABOLIC PANEL: CPT | Performed by: INTERNAL MEDICINE

## 2018-01-01 PROCEDURE — 96377 APPLICATON ON-BODY INJECTOR: CPT

## 2018-01-01 PROCEDURE — 74011250636 HC RX REV CODE- 250/636

## 2018-01-01 RX ORDER — HEPARIN SODIUM (PORCINE) LOCK FLUSH IV SOLN 100 UNIT/ML 100 UNIT/ML
500 SOLUTION INTRAVENOUS AS NEEDED
Status: DISPENSED | OUTPATIENT
Start: 2018-01-01 | End: 2018-01-01

## 2018-01-01 RX ORDER — FENTANYL 25 UG/1
1 PATCH TRANSDERMAL
Qty: 1 PATCH | Refills: 0 | Status: SHIPPED | OUTPATIENT
Start: 2018-01-01

## 2018-01-01 RX ORDER — HYDROMORPHONE HYDROCHLORIDE 1 MG/ML
2 INJECTION, SOLUTION INTRAMUSCULAR; INTRAVENOUS; SUBCUTANEOUS ONCE
Status: COMPLETED | OUTPATIENT
Start: 2018-01-01 | End: 2018-01-01

## 2018-01-01 RX ORDER — ONDANSETRON 2 MG/ML
8 INJECTION INTRAMUSCULAR; INTRAVENOUS ONCE
Status: CANCELLED | OUTPATIENT
Start: 2018-01-01 | End: 2018-01-01

## 2018-01-01 RX ORDER — OXYCODONE AND ACETAMINOPHEN 5; 325 MG/1; MG/1
2 TABLET ORAL
Qty: 120 TAB | Refills: 0 | Status: SHIPPED | OUTPATIENT
Start: 2018-01-01 | End: 2018-01-01 | Stop reason: ALTCHOICE

## 2018-01-01 RX ORDER — DIPHENHYDRAMINE HCL 25 MG
TABLET ORAL
Qty: 60 TAB | Refills: 6 | Status: SHIPPED | OUTPATIENT
Start: 2018-01-01

## 2018-01-01 RX ORDER — ONDANSETRON 2 MG/ML
8 INJECTION INTRAMUSCULAR; INTRAVENOUS ONCE
Status: DISCONTINUED | OUTPATIENT
Start: 2018-01-01 | End: 2018-01-01

## 2018-01-01 RX ORDER — METOCLOPRAMIDE 10 MG/1
20 TABLET ORAL
COMMUNITY
End: 2018-01-01 | Stop reason: SDUPTHER

## 2018-01-01 RX ORDER — SUCRALFATE 1 G/10ML
2 SUSPENSION ORAL 4 TIMES DAILY
COMMUNITY
End: 2018-01-01 | Stop reason: SDUPTHER

## 2018-01-01 RX ORDER — METOPROLOL SUCCINATE 50 MG/1
TABLET, EXTENDED RELEASE ORAL
Qty: 60 TAB | Refills: 0 | Status: SHIPPED | OUTPATIENT
Start: 2018-01-01

## 2018-01-01 RX ORDER — SODIUM CHLORIDE 0.9 % (FLUSH) 0.9 %
10-40 SYRINGE (ML) INJECTION AS NEEDED
Status: DISCONTINUED | OUTPATIENT
Start: 2018-01-01 | End: 2018-01-01 | Stop reason: HOSPADM

## 2018-01-01 RX ORDER — LANOLIN ALCOHOL/MO/W.PET/CERES
400 CREAM (GRAM) TOPICAL DAILY
Qty: 60 TAB | Refills: 1 | Status: SHIPPED | OUTPATIENT
Start: 2018-01-01

## 2018-01-01 RX ORDER — OXYCODONE HYDROCHLORIDE 15 MG/1
15 TABLET ORAL
Qty: 120 TAB | Refills: 0 | Status: SHIPPED | OUTPATIENT
Start: 2018-01-01 | End: 2018-01-01 | Stop reason: SDUPTHER

## 2018-01-01 RX ORDER — SODIUM CHLORIDE 0.9 % (FLUSH) 0.9 %
10-40 SYRINGE (ML) INJECTION AS NEEDED
Status: DISCONTINUED | OUTPATIENT
Start: 2018-01-01 | End: 2018-07-02 | Stop reason: HOSPADM

## 2018-01-01 RX ORDER — FAMOTIDINE 20 MG/1
20 TABLET, FILM COATED ORAL 2 TIMES DAILY
Qty: 60 TAB | Refills: 0 | Status: SHIPPED | OUTPATIENT
Start: 2018-01-01 | End: 2018-01-01 | Stop reason: SDUPTHER

## 2018-01-01 RX ORDER — ERGOCALCIFEROL 1.25 MG/1
50000 CAPSULE ORAL
Qty: 12 CAP | Refills: 2 | Status: SHIPPED | OUTPATIENT
Start: 2018-01-01

## 2018-01-01 RX ORDER — PROCHLORPERAZINE MALEATE 10 MG
TABLET ORAL
Qty: 60 TAB | Refills: 6 | Status: SHIPPED | OUTPATIENT
Start: 2018-01-01

## 2018-01-01 RX ORDER — FENTANYL 12.5 UG/1
1 PATCH TRANSDERMAL
Qty: 1 PATCH | Refills: 0 | Status: SHIPPED | OUTPATIENT
Start: 2018-01-01

## 2018-01-01 RX ORDER — HEPARIN SODIUM (PORCINE) LOCK FLUSH IV SOLN 100 UNIT/ML 100 UNIT/ML
500 SOLUTION INTRAVENOUS AS NEEDED
Status: ACTIVE | OUTPATIENT
Start: 2018-01-01 | End: 2018-01-01

## 2018-01-01 RX ORDER — BENZONATATE 100 MG/1
100 CAPSULE ORAL
COMMUNITY

## 2018-01-01 RX ORDER — LORAZEPAM 0.5 MG/1
0.5 TABLET ORAL
Qty: 30 TAB | Refills: 0 | Status: SHIPPED | OUTPATIENT
Start: 2018-01-01

## 2018-01-01 RX ORDER — DIPHENHYDRAMINE HYDROCHLORIDE 50 MG/ML
25 INJECTION, SOLUTION INTRAMUSCULAR; INTRAVENOUS ONCE
Status: COMPLETED | OUTPATIENT
Start: 2018-01-01 | End: 2018-01-01

## 2018-01-01 RX ORDER — POTASSIUM CHLORIDE 20 MEQ/1
20 TABLET, EXTENDED RELEASE ORAL DAILY
Qty: 30 TAB | Refills: 0 | Status: SHIPPED | OUTPATIENT
Start: 2018-01-01

## 2018-01-01 RX ORDER — OXYCODONE HYDROCHLORIDE 5 MG/1
5 CAPSULE ORAL
Qty: 120 CAP | Refills: 0 | Status: SHIPPED | OUTPATIENT
Start: 2018-01-01 | End: 2018-01-01 | Stop reason: DRUGHIGH

## 2018-01-01 RX ORDER — OXYCODONE HYDROCHLORIDE 30 MG/1
TABLET, FILM COATED, EXTENDED RELEASE ORAL
Qty: 60 TAB | Refills: 0 | Status: SHIPPED | OUTPATIENT
Start: 2018-01-01

## 2018-01-01 RX ORDER — FENTANYL 37.5 UG/H
37.5 PATCH, EXTENDED RELEASE TRANSDERMAL
Qty: 10 PATCH | Refills: 0 | Status: SHIPPED | OUTPATIENT
Start: 2018-01-01 | End: 2018-01-01 | Stop reason: RX

## 2018-01-01 RX ORDER — SODIUM CHLORIDE 9 MG/ML
250 INJECTION, SOLUTION INTRAVENOUS ONCE
Status: DISPENSED | OUTPATIENT
Start: 2018-01-01 | End: 2018-01-01

## 2018-01-01 RX ORDER — KETOROLAC TROMETHAMINE 10 MG/1
10 TABLET, FILM COATED ORAL
Qty: 120 TAB | Refills: 2 | Status: SHIPPED | OUTPATIENT
Start: 2018-01-01

## 2018-01-01 RX ORDER — WARFARIN 1 MG/1
TABLET ORAL
Qty: 90 TAB | Refills: 3 | Status: SHIPPED | OUTPATIENT
Start: 2018-01-01 | End: 2018-01-01 | Stop reason: CLARIF

## 2018-01-01 RX ORDER — SUCRALFATE 1 G/10ML
2 SUSPENSION ORAL 4 TIMES DAILY
Qty: 120 ML | Refills: 0 | Status: SHIPPED | OUTPATIENT
Start: 2018-01-01

## 2018-01-01 RX ORDER — METOCLOPRAMIDE 10 MG/1
20 TABLET ORAL
Qty: 90 TAB | Refills: 3 | Status: SHIPPED | OUTPATIENT
Start: 2018-01-01

## 2018-01-01 RX ORDER — HEPARIN 100 UNIT/ML
500 SYRINGE INTRAVENOUS ONCE
Status: COMPLETED | OUTPATIENT
Start: 2018-01-01 | End: 2018-01-01

## 2018-01-01 RX ORDER — DEXAMETHASONE SODIUM PHOSPHATE 4 MG/ML
8 INJECTION, SOLUTION INTRA-ARTICULAR; INTRALESIONAL; INTRAMUSCULAR; INTRAVENOUS; SOFT TISSUE ONCE
Status: DISCONTINUED | OUTPATIENT
Start: 2018-01-01 | End: 2018-01-01

## 2018-01-01 RX ORDER — LIDOCAINE AND PRILOCAINE 25; 25 MG/G; MG/G
CREAM TOPICAL
Status: COMPLETED | OUTPATIENT
Start: 2018-01-01 | End: 2018-01-01

## 2018-01-01 RX ORDER — HEPARIN 100 UNIT/ML
500 SYRINGE INTRAVENOUS ONCE
Status: ACTIVE | OUTPATIENT
Start: 2018-01-01 | End: 2018-01-01

## 2018-01-01 RX ORDER — ENOXAPARIN SODIUM 100 MG/ML
140 INJECTION SUBCUTANEOUS DAILY
Qty: 30 SYRINGE | Refills: 5 | Status: SHIPPED | OUTPATIENT
Start: 2018-01-01

## 2018-01-01 RX ORDER — FENTANYL 12.5 UG/1
1 PATCH TRANSDERMAL
Qty: 10 PATCH | Refills: 0 | Status: SHIPPED | OUTPATIENT
Start: 2018-01-01 | End: 2018-01-01 | Stop reason: SDUPTHER

## 2018-01-01 RX ORDER — LIDOCAINE AND PRILOCAINE 25; 25 MG/G; MG/G
CREAM TOPICAL
Qty: 30 G | Refills: 6 | Status: SHIPPED | OUTPATIENT
Start: 2018-01-01

## 2018-01-01 RX ORDER — HYDROMORPHONE HYDROCHLORIDE 4 MG/ML
2 INJECTION, SOLUTION INTRAMUSCULAR; INTRAVENOUS; SUBCUTANEOUS ONCE
Status: DISCONTINUED | OUTPATIENT
Start: 2018-01-01 | End: 2018-01-01

## 2018-01-01 RX ORDER — ONDANSETRON 2 MG/ML
8 INJECTION INTRAMUSCULAR; INTRAVENOUS ONCE
Status: COMPLETED | OUTPATIENT
Start: 2018-01-01 | End: 2018-01-01

## 2018-01-01 RX ORDER — ONDANSETRON HYDROCHLORIDE 8 MG/1
TABLET, FILM COATED ORAL
Qty: 9 TAB | Refills: 6 | Status: SHIPPED | OUTPATIENT
Start: 2018-01-01

## 2018-01-01 RX ORDER — METOPROLOL SUCCINATE 50 MG/1
50 TABLET, EXTENDED RELEASE ORAL DAILY
Qty: 60 TAB | Refills: 0 | Status: SHIPPED | OUTPATIENT
Start: 2018-01-01 | End: 2018-01-01 | Stop reason: SDUPTHER

## 2018-01-01 RX ORDER — FENTANYL 25 UG/1
1 PATCH TRANSDERMAL
Qty: 10 PATCH | Refills: 0 | Status: SHIPPED | OUTPATIENT
Start: 2018-01-01 | End: 2018-01-01 | Stop reason: SDUPTHER

## 2018-01-01 RX ORDER — FAMOTIDINE 20 MG/1
20 TABLET, FILM COATED ORAL 2 TIMES DAILY
Qty: 60 TAB | Refills: 0 | Status: SHIPPED | OUTPATIENT
Start: 2018-01-01

## 2018-01-01 RX ORDER — DEXAMETHASONE SODIUM PHOSPHATE 4 MG/ML
8 INJECTION, SOLUTION INTRA-ARTICULAR; INTRALESIONAL; INTRAMUSCULAR; INTRAVENOUS; SOFT TISSUE ONCE
Status: ACTIVE | OUTPATIENT
Start: 2018-01-01 | End: 2018-01-01

## 2018-01-01 RX ORDER — OXYCODONE HYDROCHLORIDE 15 MG/1
15 TABLET ORAL
Qty: 120 TAB | Refills: 0 | Status: SHIPPED | OUTPATIENT
Start: 2018-01-01

## 2018-01-01 RX ORDER — LIDOCAINE AND PRILOCAINE 25; 25 MG/G; MG/G
CREAM TOPICAL
Qty: 30 G | Refills: 6 | Status: SHIPPED | OUTPATIENT
Start: 2018-01-01 | End: 2018-01-01 | Stop reason: SDUPTHER

## 2018-01-01 RX ORDER — SODIUM CHLORIDE 9 MG/ML
250 INJECTION, SOLUTION INTRAVENOUS ONCE
Status: COMPLETED | OUTPATIENT
Start: 2018-01-01 | End: 2018-01-01

## 2018-01-01 RX ORDER — SODIUM CHLORIDE 0.9 % (FLUSH) 0.9 %
10 SYRINGE (ML) INJECTION AS NEEDED
Status: DISCONTINUED | OUTPATIENT
Start: 2018-01-01 | End: 2018-01-01 | Stop reason: HOSPADM

## 2018-01-01 RX ADMIN — HYDROMORPHONE HYDROCHLORIDE 2 MG: 1 INJECTION, SOLUTION INTRAMUSCULAR; INTRAVENOUS; SUBCUTANEOUS at 10:58

## 2018-01-01 RX ADMIN — HEPARIN SODIUM (PORCINE) LOCK FLUSH IV SOLN 100 UNIT/ML 500 UNITS: 100 SOLUTION at 12:23

## 2018-01-01 RX ADMIN — Medication 20 ML: at 12:22

## 2018-01-01 RX ADMIN — LIDOCAINE AND PRILOCAINE: 25; 25 CREAM TOPICAL at 09:39

## 2018-01-01 RX ADMIN — Medication 10 ML: at 11:15

## 2018-01-01 RX ADMIN — Medication 20 ML: at 12:43

## 2018-01-01 RX ADMIN — HEPARIN SODIUM (PORCINE) LOCK FLUSH IV SOLN 100 UNIT/ML 500 UNITS: 100 SOLUTION at 13:11

## 2018-01-01 RX ADMIN — ERIBULIN MESYLATE 2.4 MG: 0.5 INJECTION INTRAVENOUS at 11:31

## 2018-01-01 RX ADMIN — Medication 10 ML: at 14:10

## 2018-01-01 RX ADMIN — Medication 20 ML: at 13:10

## 2018-01-01 RX ADMIN — Medication 40 ML: at 10:42

## 2018-01-01 RX ADMIN — SODIUM CHLORIDE 200 MG: 900 INJECTION, SOLUTION INTRAVENOUS at 14:22

## 2018-01-01 RX ADMIN — DIPHENHYDRAMINE HYDROCHLORIDE 25 MG: 50 INJECTION, SOLUTION INTRAMUSCULAR; INTRAVENOUS at 10:40

## 2018-01-01 RX ADMIN — Medication 10 ML: at 15:12

## 2018-01-01 RX ADMIN — Medication 20 ML: at 10:51

## 2018-01-01 RX ADMIN — SODIUM CHLORIDE, PRESERVATIVE FREE 500 UNITS: 5 INJECTION INTRAVENOUS at 15:12

## 2018-01-01 RX ADMIN — ONDANSETRON 8 MG: 2 INJECTION INTRAMUSCULAR; INTRAVENOUS at 10:28

## 2018-01-01 RX ADMIN — SODIUM CHLORIDE 250 ML: 900 INJECTION, SOLUTION INTRAVENOUS at 11:14

## 2018-01-01 RX ADMIN — PEGFILGRASTIM 6 MG: KIT SUBCUTANEOUS at 12:41

## 2018-01-01 RX ADMIN — HEPARIN SODIUM (PORCINE) LOCK FLUSH IV SOLN 100 UNIT/ML 500 UNITS: 100 SOLUTION at 12:43

## 2018-01-01 RX ADMIN — HEPARIN SODIUM (PORCINE) LOCK FLUSH IV SOLN 100 UNIT/ML 500 UNITS: 100 SOLUTION at 11:43

## 2018-01-01 RX ADMIN — Medication 20 ML: at 11:40

## 2018-01-01 RX ADMIN — SODIUM CHLORIDE 200 MG: 900 INJECTION, SOLUTION INTRAVENOUS at 11:25

## 2018-01-01 RX ADMIN — Medication 10 ML: at 12:09

## 2018-01-01 RX ADMIN — ERIBULIN MESYLATE 3 MG: 0.5 INJECTION INTRAVENOUS at 11:28

## 2018-01-01 RX ADMIN — SODIUM CHLORIDE 200 MG: 900 INJECTION, SOLUTION INTRAVENOUS at 12:32

## 2018-01-26 PROBLEM — I26.99 BILATERAL PULMONARY EMBOLISM (HCC): Status: ACTIVE | Noted: 2018-01-01

## 2018-01-26 NOTE — PROGRESS NOTES
Hematology/Oncology  Progress Note    Name: Stephenie Padron  Date: 2018  : 1975    PCP: Diana Edmonds NP     Ms. Jesús Hu is a 38 y.o.  female who was seen for regimen of her metastatic breast cancer. She also has pulmonary emboli  Current therapy: Coumadin    Subjective:     Ms. Berlin Childress is a 74-year-old -American woman who has ER/KY positive and HER-2 positive breast cancer. She completed a full course of adjuvant systemic chemotherapy with carboplatin and Taxotere, Perjeda and Herceptin. Recently she was found to have metastatic disease. She was also hospitalized recently with venous thromboembolism and is now on anticoagulation therapy, with Coumadin. Past medical history, family history, and social history: these were reviewed and remains unchanged. No past medical history on file. Past Surgical History:   Procedure Laterality Date    HX BREAST BIOPSY      HX CHOLECYSTECTOMY      HX TUBAL LIGATION       Social History     Social History    Marital status:      Spouse name: N/A    Number of children: N/A    Years of education: N/A     Occupational History    Not on file. Social History Main Topics    Smoking status: Never Smoker    Smokeless tobacco: Not on file    Alcohol use No    Drug use: No    Sexual activity: Yes     Other Topics Concern    Not on file     Social History Narrative    No narrative on file     Family History   Problem Relation Age of Onset    Cancer Brother     Diabetes Maternal Grandmother     Diabetes Maternal Grandfather     Diabetes Paternal Grandmother     Diabetes Paternal Grandfather      Current Outpatient Prescriptions   Medication Sig Dispense Refill    lidocaine-prilocaine (EMLA) topical cream Apply to mediport 1 hour prior to chemo. Place kitchen saran wrap over cream and mediport. This will numb the site. 30 g 6    tamoxifen (NOLVADEX) 20 mg tablet Take 1 Tab by mouth daily.  90 Tab 3    ondansetron hcl (ZOFRAN) 8 mg tablet Take one tablet every 8 hours for nausea beginning the night of chemo for 3 days 9 Tab 6    ibuprofen (MOTRIN) 800 mg tablet Take 1 Tab by mouth every eight (8) hours as needed for Pain. Take medicine with food. 90 Tab 2    diphenoxylate-atropine (LOMOTIL) 2.5-0.025 mg per tablet Take 1 tablet as needed for Diarrhea 30 Tab 3    metroNIDAZOLE (FLAGYL) 500 mg tablet Take 1 tablet daily for 7 days. 7 Tab 0    dronabinol (MARINOL) 5 mg capsule Take 1 Cap by mouth two (2) times a day. Max Daily Amount: 10 mg. 60 Cap 1    fluconazole (DIFLUCAN) 200 mg tablet Take 1 tablet daily for 7 days. 7 Tab 0    aluminum-magnesium hydroxide 200-200 mg/5 mL susp 5 mL, diphenhydrAMINE 12.5 mg/5 mL liqd 12.5 mg, lidocaine 2 % soln 5 mL 5 mL by Swish and Spit route two (2) times a day. Magic mouth wash   Maalox  Lidocaine 2% viscous   Diphenhydramine oral solution     Pharmacy to mix equal portions of ingredients to a total volume as indicated in the dispense amount. 120 mL 2    warfarin (COUMADIN) 1 mg tablet Take one tablet by mouth everyday at 6pm or after. You will continue to take this medication EVERYDAY until port is removed. 30 Tab 6    prochlorperazine (COMPAZINE) 10 mg tablet Take 1 tablet every hour 6 hour with Benadryl 25mg for nausea for 3 days then, as needed. 60 Tab 6    dexamethasone (DECADRON) 4 mg tablet Take 2 tablets twice a day the day before chemo and the day after chemo. 8 Tab 6    diphenhydrAMINE (BENADRYL ALLERGY) 25 mg tablet Take with 1 compazine every 6 hours for nausea for 3 days then as needed. 60 Tab 6       Review of Systems  Constitutional: The patient has no acute distress or discomfort. HEENT: The patient denies recent head trauma, eye pain, blurred vision,  hearing deficit, oropharyngeal mucosal pain or lesions, and the patient denies throat pain or discomfort. Lymphatics: The patient denies palpable peripheral lymphadenopathy.   Hematologic: The patient denies having bruising, bleeding, or progressive fatigue. Respiratory: Patient denies having shortness of breath, cough, sputum production, fever, or dyspnea on exertion. Cardiovascular: The patient denies having leg pain, leg swelling, heart palpitations, chest permit, chest pain, or lightheadedness. The patient denies having dyspnea on exertion. Gastrointestinal: The patient denies having nausea, emesis, or diarrhea. The patient denies having any hematemesis or blood in the stool. Genitourinary: Patient denies having urinary urgency, frequency, or dysuria. The patient denies having blood in the urine. Psychological: The patient denies having symptoms of nervousness, anxiety, depression, or thoughts of harming self. Skin: Patient denies having skin rashes, skin, ulcerations, or unexplained itching or pruritus. Musculoskeletal: The patient denies having pain in the joints or bones. Objective:     Visit Vitals    /77    Pulse (!) 123    Temp 97.9 °F (36.6 °C)    Wt 102.5 kg (226 lb)    BMI 36.48 kg/m2     ECOG PS=0; Pain score=0/10    Physical Exam:   Gen. Appearance: The patient is in no acute distress. Skin: There is no bruise or rash. HEENT: The exam is unremarkable. Neck: Supple without lymphadenopathy or thyromegaly. Lungs: Clear to auscultation and percussion; there are no wheezes or rhonchi. Heart: Regular rate and rhythm; there are no murmurs, gallops, or rubs. Abdomen: Bowel sounds are present and normal.  There is no guarding, tenderness, or hepatosplenomegaly. Extremities: There is no clubbing, cyanosis, or edema. Neurologic: There are no focal neurologic deficits. Lymphatics: There is no palpable peripheral lymphadenopathy. Musculoskeletal: The patient has full range of motion at all joints. There is no evidence of joint deformity or effusions. There is no focal joint tenderness. Psychological/psychiatric: There is no clinical evidence of anxiety, depression, or melancholy.     Lab data:      Results for orders placed or performed during the hospital encounter of 01/26/18   CBC WITH 3 PART DIFF     Status: Abnormal   Result Value Ref Range Status    WBC 5.5 4.5 - 13.0 K/uL Final    RBC 4.80 4. 10 - 5.10 M/uL Final    HGB 13.1 12.0 - 16.0 g/dL Final    HCT 39.6 36 - 48 % Final    MCV 82.5 78 - 102 FL Final    MCH 27.3 25.0 - 35.0 PG Final    MCHC 33.1 31 - 37 g/dL Final    RDW 16.5 (H) 11.5 - 14.5 % Final    PLATELET 67 (L) 712 - 440 K/uL Final    NEUTROPHILS 85 (H) 40 - 70 % Final    MIXED CELLS 5 0.1 - 17 % Final    LYMPHOCYTES 11 (L) 14 - 44 % Final    ABS. NEUTROPHILS 4.6 1.8 - 9.5 K/UL Final    ABS. MIXED CELLS 0.3 0.0 - 2.3 K/uL Final    ABS. LYMPHOCYTES 0.6 (L) 1.1 - 5.9 K/UL Final     Comment: Test performed at [address] Results Reviewed by Medical Director. DF AUTOMATED   Final           Assessment:     1. Metastatic breast cancer ([facility]    2. Antineoplastic chemotherapy induced anemia    3. Bilateral pulmonary embolism (HCC)      Plan:   Metastatic breast cancer: At this time I am recommending that the patient received treatment with single agent [place]aven at a dose of 1.4 mg/m² given on days 1 and 8 of each 21 day cycle. We have reviewed the risk, benefits, and side effects of the treatment regimen. The patient had her questions answered to her satisfaction. We have discussed premedications and post treatment medications and the patient has signed a consent form for treatment. We will plan to begin her treatment on Wednesday, 1/31/2018. Chemotherapy-induced anemia: I explained to the patient that she has corrected to normal hemoglobin of 13.1 with hematocrit of 39.6 from her prior chemotherapy. If she develops anemia with this second line chemotherapy agent and we will start her on Procrit at a dose of 60,000 units subcutaneous every 2 weeks. Bilateral pulmonary embolism: The patient is currently on Coumadin.   We will check an INR value in clinic today and dose adjust as needed. The INR value today is 1.5 and she is taking 3 mg of Coumadin. I have instructed the patient increase her Coumadin dose to 5 mg and we will recheck her again on Monday, 1/29/2018. Additionally, the patient was instructed to take the medication between the hours of 6 PM and 8 PM daily. Follow-up in 3 weeks. Orders Placed This Encounter    COMPLETE CBC & AUTO DIFF WBC    InHouse CBC ([employer])     Standing Status:   Future     Number of Occurrences:   1     Standing Expiration Date:   3/0/5211    METABOLIC PANEL, COMPREHENSIVE     Standing Status:   Future     Standing Expiration Date:   1/27/2019    CA 27.29     Standing Status:   Future     Standing Expiration Date:   1/27/2019       Manuel Cummings MD  1/26/2018      Please note: This document has been produced using voice recognition software. Unrecognized errors in transcription may be present.

## 2018-01-26 NOTE — PATIENT INSTRUCTIONS
Learning About Breast Cancer Treatments  Your Care Instructions    Breast cancer means abnormal cells grow out of control in one or both breasts. These cancer cells can spread from the breast to nearby lymph nodes and other tissues. They can also spread to other parts of the body. The type and stage of cancer you have is based on:  · Where in the breast the cancer started. · The genetics of those cancer cells. · How far cancer has spread within the breast, to nearby tissues, or to other organs. · What the cancer cells look like under a microscope. · Whether there is cancer in the nearby lymph nodes. Tests that help find the stage of your cancer can help choose the right treatment for you. These tests can include a breast biopsy, lymph node biopsy, blood tests, and X-rays. You may need other tests as well, such as a bone, CT, or MRI scan. Whether you have more tests depends on your symptoms and the stage of the cancer. When you find out that you have cancer, you may feel many emotions and may need some help coping. Seek out family, friends, and counselors for support. You also can do things at home to make yourself feel better while you go through treatment. Call the [employer] ([phone]) or visit its website at [email or web address] for more information. How is breast cancer treated? Your doctor may combine treatments. This is a common way to treat breast cancer. Treatment depends on what type and stage of cancer you have. You may have:  · Surgery to remove the cancer. · Radiation. This uses high-dose X-rays to kill cancer cells and shrink tumors. · Chemotherapy. This uses medicine to kill cancer cells. · Hormone therapy. This uses medicines such as tamoxifen. It limits the effect of the hormone estrogen. This hormone can help some types of breast cancer cells to grow. · Biological therapy.  This uses medicines such as trastuzumab (Herceptin) to help your immune system fight the cancer. What surgeries are done for breast cancer? Surgery is a key part of treatment for breast cancer. The main types of surgeries are:  · Breast-conserving surgery. This is surgery that does not remove the whole breast. This includes:  ¨ Lumpectomy. This surgery removes the cancer in the breast and some of the normal tissue around it. ¨ Partial mastectomy. This surgery removes part of the breast. The lining of the chest muscles below the cancer and some of the lymph nodes may also be removed. · Surgery to remove the breast. This includes:  ¨ Total mastectomy. This removes the whole breast.  ¨ Nipple-sparing mastectomy. This removes the whole breast but leaves the nipple. ¨ Modified radical mastectomy. This removes the whole breast and the lymph nodes under the arm (axillary lymph nodes). ¨ Radical mastectomy. This removes the whole breast, the chest muscles, and all the lymph nodes under the arm. If lymph nodes under the arm are removed, they are looked at under the microscope to check for cancer. There are two types of lymph node removal:  · [place] lymph node biopsy removes the lymph node that is the first to receive lymph fluid from the tumor. If cancer has spread from the breast to the lymph nodes, cancer cells most often show up in the sentinel node first.  · Axillary lymph node dissection removes most of the lymph nodes in the armpit. The type of surgery you have depends on the size, location, and type of the cancer. It also depends on your overall health and personal preferences. Even if your doctor removes all the cancer that can be seen at the time of your surgery, you may still need more treatment. You may get radiation, chemotherapy, or hormone therapy after surgery to try to kill any cancer cells that may be left. No matter what kind of surgery you have, you will get information about why you are having it, what its risks are, how to prepare, and what to do after surgery.   Follow-up care is a key part of your treatment and safety. Be sure to make and go to all appointments, and call your doctor if you are having problems. It's also a good idea to know your test results and keep a list of the medicines you take. Where can you learn more? Go to http://lalita-emory.info/. Enter X810 in the search box to learn more about \"Learning About Breast Cancer Treatments. \"  Current as of: May 12, 2017  Content Version: 11.4  © 1523-9482 Healthwise, [employer]. Care instructions adapted under license by [employer] (which disclaims liability or warranty for this information). If you have questions about a medical condition or this instruction, always ask your healthcare professional. [address] any warranty or liability for your use of this information.

## 2018-01-31 NOTE — PROGRESS NOTES
[facility] Progress Note    Date: 2018    Name: Michelle Mancia              MRN: [id number]              : 1975    Maria A Perez was assessed and education was provided. Care notes were printed off and given to the patient. A signed copy was scanned into the media tab. Ms. Jnesen's vitals were reviewed and patient was observed for 5 minutes prior to treatment. Visit Vitals    /81 (BP 1 Location: Left arm, BP Patient Position: Sitting)    Pulse 100    Temp 98 °F (36.7 °C)    Resp 18    Ht 5' 6.14\" (1.68 m)    Wt 100.3 kg (221 lb 3.2 oz)    SpO2 97%    Breastfeeding No    BMI 35.55 kg/m2     Left chest single lumen Mediport was accessed using sterile technique with a 20 gauge Palacios needle. Port flushes easily and has brisk blood return. Blood drawn off for labs (CBC, BMP and Mag) after a 10 ml waste. Lab results were obtained and reviewed. Recent Results (from the past 12 hour(s))   CBC WITH 3 PART DIFF    Collection Time: 18  9:42 AM   Result Value Ref Range    WBC 4.8 4.5 - 13.0 K/uL    RBC 4.25 4. 10 - 5.10 M/uL    HGB 12.1 12.0 - 16 g/dL    HCT 35.8 (L) 36 - 48 %    MCV 84.2 78 - 102 FL    MCH 28.5 25.0 - 35.0 PG    MCHC 33.8 31 - 37 g/dL    RDW 16.6 (H) 11.5 - 14.5 %    PLATELET 71 (L) 393 - 440 K/uL    NEUTROPHILS 79 (H) 40 - 70 %    MIXED CELLS 4 0.1 - 17 %    LYMPHOCYTES 17 14 - 44 %    ABS. NEUTROPHILS 3.8 1.8 - 9.5 K/UL    ABS. MIXED CELLS 0.2 0.0 - 2.3 K/uL    ABS.  LYMPHOCYTES 0.8 (L) 1.1 - 5.9 K/UL    DF AUTOMATED     MAGNESIUM    Collection Time: 18  9:42 AM   Result Value Ref Range    Magnesium 1.3 (L) 1.6 - 2.6 mg/dL   POC CHEM8    Collection Time: 18  9:47 AM   Result Value Ref Range    CO2, POC 25 (H) 19 - 24 MMOL/L    Glucose,  (HH) 74 - 106 MG/DL    BUN, POC 17 7 - 18 MG/DL    Creatinine, POC 0.8 0.6 - 1.3 MG/DL    GFRAA, POC >60 >60 ml/min/1.73m2    GFRNA, POC >60 >60 ml/min/1.73m2    Sodium,  136 - 145 MMOL/L Potassium, POC 4.0 3.5 - 5.5 MMOL/L    Calcium, ionized (POC) 1.14 1.12 - 1.32 MMOL/L    Chloride,  100 - 108 MMOL/L    Anion gap, POC 16 10 - 20      Hematocrit, POC 35 (L) 36 - 49 %    Hemoglobin, POC 11.9 (L) 12 - 16 G/DL     ANC= 3.8, PLT= 71 and Glucose= 405. Patient states that she has a prescription for Decadron 4 mg BID daily for 10 days. Ms. Nemo Flores states that she did take her Decadron this morning along with her other prescribed medications prior to coming into the [facility] today. Dodie Ramirez NP was notified of the above lab results and that the patient already took PO Decadron this AM. A written order was received that it is OK to proceed with the chemotherapy today but to hold the Decadron premedication since she took it already this morning. No new orders were given at this time. NS started at [facility]. Halaven 3 mg was infused via IVP over 10 minuets. No reactions or distress noted. Blood return reverified. Patient remained for a 30 minuet post infusion observation period. No distress or reactions noted. VS remained stable. Mediport was flushed with 20 ml NS and 500 units of Heparin then de-accessed after blood return was reverified. Band-aid applied to the site. Patient Vitals for the past 12 hrs:   Temp Pulse Resp BP SpO2   01/31/18 1221 98 °F (36.7 °C) 100 18 130/81 97 %   01/31/18 0922 97.7 °F (36.5 °C) (!) 107 18 139/86 96 %     Ms. Jensen tolerated infusion, and had no complaints at this time. Patient armband removed and shredded. Ms. Nemo Flores was discharged from [place] [address] in stable condition at 1230. She is to return on 02/07/2018 at 1100 for her next appointment.      Laurel Roman RN  January 31, 2018

## 2018-02-05 NOTE — PROGRESS NOTES
Patients mother also stated that Ms. Irby Carrier was having chest pain. Pt's mother ad patient were both instructed that Ms. Irby Carrier needs to go to the ED. Patient declined to go, stating she believes it gas.

## 2018-02-09 NOTE — TELEPHONE ENCOUNTER
[facility]. She is at [phone]. She said it regards notes placed in chart by Kavitha Dockery., and she is not in today.

## 2018-02-09 NOTE — TELEPHONE ENCOUNTER
Called [facility] with that number provided. They need the Teri's last name and what department. The operater does not know which [place] we want.

## 2018-02-19 NOTE — TELEPHONE ENCOUNTER
Spoke to patient . He report that patient complain of leg pain and swelling. Report this started since they came back from the ER on Friday. Pt is currently on coumadin. Recommended that they go to ER for evaluation and possible doppler on the leg. Pt denies any chest pain or shortness of breath.

## 2018-02-22 NOTE — PROGRESS NOTES
[facility] Progress [facility]    Date: 2018    Name: Joel Olmstead              MRN: [id number]              : 1975    [facility]    Ms. Hilario Korey was assessed and education was provided. .     Ms. Jensen's vitals were reviewed and patient was observed for 5 minutes prior to treatment. Visit Vitals    /85 (BP 1 Location: Left arm, BP Patient Position: Sitting)    Pulse (!) 122    Temp 98.1 °F (36.7 °C)    Resp 20    Ht 5' 6\" (1.676 m)    Wt 91 kg (200 lb 9 oz)    SpO2 99%    BMI 32.37 kg/m2     Left chest single lumen Mediport was accessed using sterile technique with a 20 gauge Palacios needle. Port flushes easily and has brisk blood return. Blood drawn off for labs (CBC, BMP, HEP FUNC and Mag) after a 10 ml waste. Patient refused uring pregnancy test, stated not necessary. PT/INR drawn due to no one in lab able to do finger stick. Lab results were obtained and reviewed. Recent Results (from the past 12 hour(s))   CBC WITH 3 PART DIFF    Collection Time: 18 10:00 AM   Result Value Ref Range    WBC 5.4 4.5 - 13.0 K/uL    RBC 3.09 (L) 4.10 - 5.10 M/uL    HGB 8.5 (L) 12.0 - 16 g/dL    HCT 26.6 (L) 36 - 48 %    MCV 86.1 78 - 102 FL    MCH 27.5 25.0 - 35.0 PG    MCHC 32.0 31 - 37 g/dL    RDW 17.1 (H) 11.5 - 14.5 %    PLATELET 475 192 - 296 K/uL    NEUTROPHILS 71 (H) 40 - 70 %    MIXED CELLS 9 0.1 - 17 %    LYMPHOCYTES 20 14 - 44 %    ABS. NEUTROPHILS 3.8 1.8 - 9.5 K/UL    ABS. MIXED CELLS 0.5 0.0 - 2.3 K/uL    ABS. LYMPHOCYTES 1.1 1.1 - 5.9 K/UL    DF AUTOMATED     HEPATIC FUNCTION PANEL    Collection Time: 18 10:00 AM   Result Value Ref Range    Protein, total 5.9 (L) 6.4 - 8.2 g/dL    Albumin 2.6 (L) 3.4 - 5.0 g/dL    Globulin 3.3 2.0 - 4.0 g/dL    A-G Ratio 0.8 0.8 - 1.7      Bilirubin, total 0.6 0.2 - 1.0 MG/DL    Bilirubin, direct 0.3 (H) 0.0 - 0.2 MG/DL    Alk.  phosphatase 218 (H) 45 - 117 U/L    AST (SGOT) 32 15 - 37 U/L    ALT (SGPT) 18 13 - 56 U/L   MAGNESIUM    Collection Time: 02/22/18 10:00 AM   Result Value Ref Range    Magnesium 1.0 (L) 1.6 - 2.6 mg/dL   PROTHROMBIN TIME + INR    Collection Time: 02/22/18 10:00 AM   Result Value Ref Range    Prothrombin time 15.5 (H) 11.5 - 15.2 sec    INR 1.3 (H) 0.8 - 1.2     POC CHEM8    Collection Time: 02/22/18 10:07 AM   Result Value Ref Range    CO2, POC 24 19 - 24 MMOL/L    Glucose, POC 89 74 - 106 MG/DL    BUN, POC 17 7 - 18 MG/DL    Creatinine, POC 2.2 (H) 0.6 - 1.3 MG/DL    GFRAA, POC 30 (L) >60 ml/min/1.73m2    GFRNA, POC 25 (L) >60 ml/min/1.73m2    Sodium,  136 - 145 MMOL/L    Potassium, POC 3.3 (L) 3.5 - 5.5 MMOL/L    Calcium, ionized (POC) 1.11 (L) 1.12 - 1.32 MMOL/L    Chloride,  100 - 108 MMOL/L    Anion gap, POC 16 10 - 20      Hematocrit, POC 23 (L) 36 - 49 %    Hemoglobin, POC 7.8 (L) 12 - 16 G/DL     NP Doug, made aware of today's labs and patients swelling in her feet and ankles. Premedications bendaryl and zofran given as ordered. Halaven 2.4 mg was infused via IVP over 5 minutes as ordered. No reactions or distress noted. Blood return reverified. Neulasta OBI placed on left back of arm. Procrit ordered but did not have insurance verification at this time to give, patient will come Saturday to receive injection. Mediport was flushed with 20 ml NS and 500 units of Heparin then de-accessed after blood return was reverified. Band-aid applied to the site. Patient Vitals for the past 12 hrs:   Temp Pulse Resp BP SpO2   02/22/18 0937 98.1 °F (36.7 °C) (!) 122 20 118/85 99 %     Ms. Jensen tolerated infusion, and had no complaints at this time. Patient armband removed and shredded. Ms. Zach Edgar was discharged from [place] [address] in stable condition at 1250. She is to return on 02/24/2018 at 56 for her next appointment.      Ila Merritt RN  February 22, 2018

## 2018-02-23 NOTE — PROGRESS NOTES
Pt INR was 1.3 on 2/22/2018. Advise pt to start taking 2 mg of coumadin once a day and recheck INR in 1 week.

## 2018-02-27 NOTE — TELEPHONE ENCOUNTER
Patient's spouse wanted to let us know she is in the ER with pain, worried her blood clot might have moved, and wanted to be sure we know of the situation.   If you need to contact him , he is at cell [phone]    Encounter is fyi only

## 2018-02-28 NOTE — TELEPHONE ENCOUNTER
Freddie Guajardo, sp of patient , called and asked for you. Wants to let you know patient is having surgery today at [facility] to remove fluid from around her heart. He said this brings up questions he has regarding her chemo treatment. Please call him back at [phone] as soon as you can.

## 2018-03-05 NOTE — PROGRESS NOTES
Pharmacy Note     Name: Megha Pereyra  : 1975  Estimated body surface area is 2.06 meters squared as calculated from the following:    Height as of 18: 167.6 cm (66\"). Weight as of 18: 91 kg (200 lb 9 oz). Diagnosis: metastatic breast cancer  Treatment Plan: nivolumab (Opdivo) 240 mg every 14 days  Cycle/Day: Cycles 1-8, day 1 only  Cycle Start Date: 3/15/2018    Lab Results   Component Value Date/Time    WBC 5.4 2018 10:00 AM    PLATELET 906  10:00 AM     Lab Results   Component Value Date/Time    ABS. NEUTROPHILS 3.8 2018 10:00 AM     Lab Results   Component Value Date/Time    Creatinine, POC 2.2 (H) 2018 10:07 AM    Creatinine 1.05 2018 02:30 PM     Most Recent Creatinine Clearance:  CrCl: 0.0 mL/min (based on Adjusted Body Weight)  Creatinine: 1.05 mg/dL (2018)      Pharmacy Intervention:  · Per Dr. 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 Jay Kevin is FDA approved for this indication.     Pharmacist: Diaz Perez, [facility]

## 2018-03-20 NOTE — PROGRESS NOTES
[facility] Progress Note    Date: 2018    Name: Janyce Canavan              MRN: [id number]              : 1975    [facility]    Ms. Tara Lea was assessed and education was provided. Care notes were printed off and given to the patient. A signed copy was scanned into the media tab. Upon arrival to the [facility] today, the patient was complaining of nausea/vomiting and 10/10 pain in her chest. Ana Angry, NP was notified and assessed the patient. Verbal orders received for Emla cream to go over her chest prior to accessing her Mediport and Dilaudid 2 mg IVP. NP gave verbal orders that it is ok to proceed with the chemotherapy today. Ms. Jensen's vitals were reviewed and patient was observed for 5 minutes prior to treatment. Visit Vitals    BP (!) 89/68 (BP 1 Location: Left arm, BP Patient Position: Sitting)    Pulse 96    Temp 97.6 °F (36.4 °C)    Resp 16    Ht 5' 6\" (1.676 m)    Wt 99.1 kg (218 lb 8 oz)    SpO2 97%    Breastfeeding No    BMI 35.27 kg/m2     Emla cream applied over the port site and allowed to sit for approximately 40 minuets prior to accessing her port today. Left chest single lumen Mediport was accessed using sterile technique with a 20 gauge Palacios needle. Port flushes easily and has brisk blood return. Blood drawn off for labs (CBC, BMP, TSH and LFT) after a 10 ml waste. Port then flushed with 10 ml NS    Lab results were obtained and reviewed. Recent Results (from the past 12 hour(s))   CBC WITH 3 PART DIFF    Collection Time: 18 10:30 AM   Result Value Ref Range    WBC 5.9 4.5 - 13.0 K/uL    RBC 3.47 (L) 4.10 - 5.10 M/uL    HGB 9.8 (L) 12.0 - 16 g/dL    HCT 31.9 (L) 36 - 48 %    MCV 91.9 78 - 102 FL    MCH 28.2 25.0 - 35.0 PG    MCHC 30.7 (L) 31 - 37 g/dL    RDW 19.9 (H) 11.5 - 14.5 %    PLATELET 342 774 - 641 K/uL    NEUTROPHILS 75 (H) 40 - 70 %    MIXED CELLS 7 0.1 - 17 %    LYMPHOCYTES 18 14 - 44 %    ABS. NEUTROPHILS 4.4 1.8 - 9.5 K/UL    ABS.  MIXED CELLS 0.4 0.0 - 2.3 K/uL    ABS. LYMPHOCYTES 1.1 1.1 - 5.9 K/UL    DF AUTOMATED     HEPATIC FUNCTION PANEL    Collection Time: 03/20/18 10:30 AM   Result Value Ref Range    Protein, total 6.0 (L) 6.4 - 8.2 g/dL    Albumin 2.3 (L) 3.4 - 5.0 g/dL    Globulin 3.7 2.0 - 4.0 g/dL    A-G Ratio 0.6 (L) 0.8 - 1.7      Bilirubin, total 0.5 0.2 - 1.0 MG/DL    Bilirubin, direct 0.2 0.0 - 0.2 MG/DL    Alk. phosphatase 116 45 - 117 U/L    AST (SGOT) 26 15 - 37 U/L    ALT (SGPT) 13 13 - 56 U/L   TSH 3RD GENERATION    Collection Time: 03/20/18 10:30 AM   Result Value Ref Range    TSH 1.91 0.36 - 3.74 uIU/mL   POC CHEM8    Collection Time: 03/20/18 10:35 AM   Result Value Ref Range    CO2, POC 25 (H) 19 - 24 MMOL/L    Glucose, POC 80 74 - 106 MG/DL    BUN, POC 7 7 - 18 MG/DL    Creatinine, POC 1.6 (H) 0.6 - 1.3 MG/DL    GFRAA, POC 43 (L) >60 ml/min/1.73m2    GFRNA, POC 36 (L) >60 ml/min/1.73m2    Sodium,  136 - 145 MMOL/L    Potassium, POC 3.7 3.5 - 5.5 MMOL/L    Calcium, ionized (POC) 1.33 (H) 1.12 - 1.32 MMOL/L    Chloride,  100 - 108 MMOL/L    Anion gap, POC 18 10 - 20      Hematocrit, POC 31 (L) 36 - 49 %    Hemoglobin, POC 10.5 (L) 12 - 16 G/DL       ANC= 4.4, PLT= 336, WBC= 5.9, Creatine= 1.6 and HGB= 9.8. Dilaudid 2 mg IVP administered over 2 minuets per order followed by a 10 ml flush. After approximately 20 minuets, the patient stated that she felt better, her pain level was now a 2/10 and she was no longer complaining of nausea. Keytruda 200 mg was infused over 30 minuets per written order. No reactions or distress noted. Port flushed with 10 mL NS and blood return reverified. Patient remained for a 30 minuet post infusion observation period. No distress or reactions noted. VS remained stable. Mediport was flushed with 20 ml NS and 500 units of Heparin then de-accessed after blood return was reverified. Band-aid applied to the site.      HGB= 9.8 and HCT= 31.9  Procrit HELD at this time per written ordered parameters. Prior to discharge, patient was very drowsy and her BP is noted to be low at 89/68. Manolo Dominique NP was notified and she said it was OK to discharge the patient. Patient Vitals for the past 12 hrs:   Temp Pulse Resp BP SpO2   03/20/18 1243 97.6 °F (36.4 °C) 96 16 (!) 89/68 97 %   03/20/18 1208 97.6 °F (36.4 °C) 90 16 90/62 98 %   03/20/18 0922 97.7 °F (36.5 °C) (!) 110 20 111/76 92 %     Ms. Jensen tolerated infusion, and had no complaints at this time. Patient armband removed and shredded. Ms. Diane Ybarra was discharged from [place] [address] in stable condition at 1250. She is to return on 04/10/2018 at 1100 for her next appointment.      Raheel Samano RN  March 20, 2018

## 2018-03-20 NOTE — PROGRESS NOTES
Ms. Jensen's pain today is 10/10 in [facility]. She c/o pain at her chest with cough not relieved with present pain meds. In addition, she has Nausea with vomit with cough. Her present pain med regime is Duragesic Patch 25 mcg/hr and Dilauded 2 mg take 2 po Q4h prn. The patient states that today she only received Dilauded 2mg 1 po this am.     Today I am giving the patient Dilauded 2mg IV push now and Zofran 8mg now. In addition,  I am discontinuing her Duragesic patch as well as her  Dilauded and  I am starting Oxycontin 3mg BID and Oxycodone 5mg PO 1-2 po Q4h prn. I have explained to the patient and her  the medication change in effort to help improve her pain. In addition, I have instructed her to notify Dr. Handy Ricks at her appt on tomorrow if her pain is persistant with taking  Oxycodone Q4h.

## 2018-03-21 PROBLEM — G89.3 CANCER ASSOCIATED PAIN: Status: ACTIVE | Noted: 2018-01-01

## 2018-03-21 NOTE — PROGRESS NOTES
Hematology/Oncology  Progress Note    Name: Bobby Quiñones  Date: 3/21/2018  : 1975    PCP: Major Stevens NP     Ms. Claudia Storm is a 38 y.o.  female who was seen for regimen of her metastatic breast cancer. She also has pulmonary emboli  Current therapy: Coumadin    Subjective:     Ms. Fatou Lutz is a 82-year-old -American woman who has ER/MO positive and HER-2 positive breast cancer. She completed a full course of adjuvant systemic chemotherapy with carboplatin and Taxotere, Perjeda and Herceptin. Recently she was found to have metastatic disease. She was also hospitalized recently with venous thromboembolism and is now on anticoagulation therapy, with subcutaneous Lovenox. The patient recently got authorization to begin immunotherapy with Keytruda and received her first infusion yesterday. Her pain medication was changed because of hypersomnolence on her prior treatment. Today she is awake and alert and reports that her pain is well-controlled. Past medical history, family history, and social history: these were reviewed and remains unchanged. No past medical history on file. Past Surgical History:   Procedure Laterality Date    HX BREAST BIOPSY      HX CHOLECYSTECTOMY      HX TUBAL LIGATION       Social History     Social History    Marital status:      Spouse name: N/A    Number of children: N/A    Years of education: N/A     Occupational History    Not on file.      Social History Main Topics    Smoking status: Never Smoker    Smokeless tobacco: Never Used    Alcohol use No    Drug use: No    Sexual activity: Yes     Other Topics Concern    Not on file     Social History Narrative     Family History   Problem Relation Age of Onset    Cancer Brother     Diabetes Maternal Grandmother     Diabetes Maternal Grandfather     Diabetes Paternal Grandmother     Diabetes Paternal Grandfather      Current Outpatient Prescriptions   Medication Sig Dispense Refill    ketorolac (TORADOL) 10 mg tablet Take 1 Tab by mouth every six (6) hours as needed for Pain. 120 Tab 2    oxyCODONE ER 30 mg TR12 Take 1 po BID 60 Tab 0    oxyCODONE (OXYIR) 5 mg capsule Take 1 Cap by mouth every four (4) hours as needed. Max Daily Amount: 30 mg. 120 Cap 0    naloxone 2 mg/actuation spry Use 1 spray intranasally into 1 nostril. Use a new Narcan nasal spray for subsequent doses and administer into alternating nostrils. May repeat every 2 to 3 minutes as needed. 1 Packet 0    enoxaparin (LOVENOX) 100 mg/mL 140 mg by SubCUTAneous route daily. Indications: Acute Pulmonary Thromboembolism, deep venous thrombosis 30 Syringe 5    magnesium oxide (MAG-OX) 400 mg tablet Take 1 Tab by mouth daily. 60 Tab 1    famotidine (PEPCID) 20 mg tablet Take 1 Tab by mouth two (2) times a day. Indications: gastroesophageal reflux disease, Heartburn 60 Tab 0    warfarin (COUMADIN) 1 mg tablet Take three tabs po daily at 6 pm and after or as directed by MD 90 Tab 3    ondansetron hcl (ZOFRAN) 8 mg tablet Take one tablet every 8 hours for nausea beginning the night of chemo for 3 days 9 Tab 6    lidocaine-prilocaine (EMLA) topical cream Apply to mediport 1 hour prior to chemo. Place kitchen saran wrap over cream and mediport. This will numb the site. 30 g 6    prochlorperazine (COMPAZINE) 10 mg tablet Take 1 tablet every hour 6 hour with Benadryl 25mg for nausea for 3 days then, as needed. 60 Tab 6    diphenhydrAMINE (BENADRYL ALLERGY) 25 mg tablet Take with 1 compazine every 6 hours for nausea for 3 days then as needed. 60 Tab 6    tamoxifen (NOLVADEX) 20 mg tablet Take 1 Tab by mouth daily. 90 Tab 3    ibuprofen (MOTRIN) 800 mg tablet Take 1 Tab by mouth every eight (8) hours as needed for Pain. Take medicine with food. 90 Tab 2    diphenoxylate-atropine (LOMOTIL) 2.5-0.025 mg per tablet Take 1 tablet as needed for Diarrhea 30 Tab 3    metroNIDAZOLE (FLAGYL) 500 mg tablet Take 1 tablet daily for 7 days.  7 Tab 0    dronabinol (MARINOL) 5 mg capsule Take 1 Cap by mouth two (2) times a day. Max Daily Amount: 10 mg. 60 Cap 1    fluconazole (DIFLUCAN) 200 mg tablet Take 1 tablet daily for 7 days. 7 Tab 0    aluminum-magnesium hydroxide 200-200 mg/5 mL susp 5 mL, diphenhydrAMINE 12.5 mg/5 mL liqd 12.5 mg, lidocaine 2 % soln 5 mL 5 mL by Swish and Spit route two (2) times a day. Magic mouth wash   Maalox  Lidocaine 2% viscous   Diphenhydramine oral solution     Pharmacy to mix equal portions of ingredients to a total volume as indicated in the dispense amount. 120 mL 2    warfarin (COUMADIN) 1 mg tablet Take one tablet by mouth everyday at 6pm or after. You will continue to take this medication EVERYDAY until port is removed. 30 Tab 6    dexamethasone (DECADRON) 4 mg tablet Take 2 tablets twice a day the day before chemo and the day after chemo. 8 Tab 6     Facility-Administered Medications Ordered in Other Visits   Medication Dose Route Frequency Provider Last Rate Last Dose    sodium chloride (NS) flush 10-40 mL  10-40 mL IntraVENous PRN Prosper Chapa MD   20 mL at 03/20/18 1243       Review of Systems  Constitutional: The patient has no acute distress or discomfort. HEENT: The patient denies recent head trauma, eye pain, blurred vision,  hearing deficit, oropharyngeal mucosal pain or lesions, and the patient denies throat pain or discomfort. Lymphatics: The patient denies palpable peripheral lymphadenopathy. Hematologic: The patient denies having bruising, bleeding, or progressive fatigue. Respiratory: Patient denies having shortness of breath, cough, sputum production, fever, or dyspnea on exertion. Cardiovascular: The patient denies having leg pain, leg swelling, heart palpitations, chest permit, chest pain, or lightheadedness. The patient denies having dyspnea on exertion. Gastrointestinal: The patient denies having nausea, emesis, or diarrhea.  The patient denies having any hematemesis or blood in the stool.  Genitourinary: Patient denies having urinary urgency, frequency, or dysuria. The patient denies having blood in the urine. Psychological: The patient denies having symptoms of nervousness, anxiety, depression, or thoughts of harming self. Skin: Patient denies having skin rashes, skin, ulcerations, or unexplained itching or pruritus. Musculoskeletal: The patient denies having pain in the joints or bones. Objective:     Visit Vitals    BP 96/63    Pulse (!) 101    Temp 98.2 °F (36.8 °C) (Oral)    Wt 98.9 kg (218 lb)    BMI 35.19 kg/m2     ECOG PS=0; Pain score=0/10    Physical Exam:   Gen. Appearance: The patient is in no acute distress. Skin: There is no bruise or rash. HEENT: The exam is unremarkable. Neck: Supple without lymphadenopathy or thyromegaly. Lungs: Clear to auscultation and percussion; there are no wheezes or rhonchi. Heart: Regular rate and rhythm; there are no murmurs, gallops, or rubs. Abdomen: Bowel sounds are present and normal.  There is no guarding, tenderness, or hepatosplenomegaly. Extremities: There is no clubbing, cyanosis, or edema. Neurologic: There are no focal neurologic deficits. Lymphatics: There is no palpable peripheral lymphadenopathy. Musculoskeletal: The patient has full range of motion at all joints. There is no evidence of joint deformity or effusions. There is no focal joint tenderness. Psychological/psychiatric: There is no clinical evidence of anxiety, depression, or melancholy.     Lab data:      Results for orders placed or performed during the hospital encounter of 03/20/18   CBC WITH 3 PART DIFF     Status: Abnormal   Result Value Ref Range Status    WBC 5.9 4.5 - 13.0 K/uL Final    RBC 3.47 (L) 4.10 - 5.10 M/uL Final    HGB 9.8 (L) 12.0 - 16 g/dL Final    HCT 31.9 (L) 36 - 48 % Final    MCV 91.9 78 - 102 FL Final    MCH 28.2 25.0 - 35.0 PG Final    MCHC 30.7 (L) 31 - 37 g/dL Final    RDW 19.9 (H) 11.5 - 14.5 % Final    PLATELET 262 202 - 440 K/uL Final    NEUTROPHILS 75 (H) 40 - 70 % Final    MIXED CELLS 7 0.1 - 17 % Final    LYMPHOCYTES 18 14 - 44 % Final    ABS. NEUTROPHILS 4.4 1.8 - 9.5 K/UL Final    ABS. MIXED CELLS 0.4 0.0 - 2.3 K/uL Final    ABS. LYMPHOCYTES 1.1 1.1 - 5.9 K/UL Final     Comment: Test performed at [place] [address] Location. Results Reviewed by Medical Director. DF AUTOMATED   Final           Assessment:     1. Metastatic breast cancer ([facility] [address]    2. Bilateral pulmonary embolism ([facility] [address]    3. Antineoplastic chemotherapy induced anemia    4. Cancer associated pain      Plan:   Metastatic breast cancer: The patient is now receiving Keytruda at a fixed dose of 200 mg every 3 weeks. She is tolerating the initial treatments reasonably well. Therapeutic intervention with this immunotherapeutic modality will be continued. Chemotherapy-induced anemia: I explained to the patient that her CBC from 3/20/2018 showed that her WBC count was 5.9, hemoglobin was 9.8 g/dL, hematocrit 31.9%, and a platelet count was 681,238. Subcutaneous Procrit will be offered if her hemoglobin remains below 10 and if her hematocrit declines below 30%. Bilateral pulmonary embolism: The patient is currently on weight-based Lovenox at 1 mg/kg. She will need to remain on this medication indefinitely due to the hypercoagulable state caused by her metastatic breast cancer. Follow-up in 3 weeks. No orders of the defined types were placed in this encounter. Evonne Garnica MD  3/21/2018      Please note: This document has been produced using voice recognition software. Unrecognized errors in transcription may be present.

## 2018-03-21 NOTE — MR AVS SNAPSHOT
[facility], [place] [address] 200 [facility] 
[phone] Patient: Ayah Gomes MRN: [id number] :1975 Visit Information Date & Time Provider Department Dept. Phone Encounter #  
 3/21/2018 12:00 PM Gamaliel Morgan MD [place] [address] [facility] [phone] [id number] Your Appointments 2018 11:15 AM  
Office Visit with Gamaliel Morgan MD  
[place] [place] [address] [facility]) Appt Note: 3 weeks [facility], [place] 401 [facility] 3200 [facility], [address] [place] [address] 200 [facility] Upcoming Health Maintenance Date Due Pneumococcal 19-64 Highest Risk (1 of 3 - PCV13) 1996 DTaP/Tdap/Td series (1 - Tdap) 1998 PAP [facility] CERVICAL CYTOLOGY 1998 Influenza Age 5 to Adult 2017 MEDICARE YEARLY EXAM 3/14/2018 Allergies as of 3/21/2018  Review Complete On: 3/21/2018 By: Gamaliel Mogran MD  
  
 Severity Noted Reaction Type Reactions [place]  2015    Itching Shellfish Derived  2015    Swelling Current Immunizations  Reviewed on 2018 No immunizations on file. Not reviewed this visit You Were Diagnosed With   
  
 Codes Comments Metastatic breast cancer ([facility]    -  Primary ICD-10-CM: A86.172 ICD-9-CM: 174.9 Bilateral pulmonary embolism (HCC)     ICD-10-CM: I26.99 
ICD-9-CM: 415.19 Antineoplastic chemotherapy induced anemia     ICD-10-CM: D64.81, T45.1X5A 
ICD-9-CM: 285.3, E933.1 Cancer associated pain     ICD-10-CM: G89.3 ICD-9-CM: 338. 3 Vitals BP Pulse Temp Weight(growth percentile) BMI Smoking Status 96/ (!) 101 98.2 °F (36.8 °C) (Oral) 218 lb (98.9 kg) 35.19 kg/m2 Never Smoker BMI and BSA Data Body Mass Index Body Surface Area  
 35.19 kg/m 2 2.15 m 2 Preferred Pharmacy Pharmacy Name Phone [facility] [address] [facility], Letineha SANCHEZ [facility] AT [facility] [phone] Your Updated Medication List  
  
   
This list is accurate as of 3/21/18 12:43 PM.  Always use your most recent med list.  
  
  
  
  
 aluminum-magnesium hydroxide 200-200 mg/5 mL susp 5 mL, diphenhydrAMINE 12.5 mg/5 mL liqd 12.5 mg, lidocaine 2 % soln 5 mL  
5 mL by Swish and Spit route two (2) times a day. Magic mouth wash  Maalox Lidocaine 2% viscous  Diphenhydramine oral solution   Pharmacy to mix equal portions of ingredients to a total volume as indicated in the dispense amount. dexamethasone 4 mg tablet Commonly known as:  DECADRON Take 2 tablets twice a day the day before chemo and the day after chemo. diphenhydrAMINE 25 mg tablet Commonly known as:  BENADRYL ALLERGY Take with 1 compazine every 6 hours for nausea for 3 days then as needed. diphenoxylate-atropine 2.5-0.025 mg per tablet Commonly known as:  LOMOTIL Take 1 tablet as needed for Diarrhea  
  
 dronabinol 5 mg capsule Commonly known as:  Karene Knock Take 1 Cap by mouth two (2) times a day. Max Daily Amount: 10 mg.  
  
 enoxaparin 100 mg/mL Commonly known as:  LOVENOX  
140 mg by SubCUTAneous route daily. Indications: Acute Pulmonary Thromboembolism, deep venous thrombosis  
  
 famotidine 20 mg tablet Commonly known as:  PEPCID Take 1 Tab by mouth two (2) times a day. Indications: gastroesophageal reflux disease, Heartburn  
  
 fluconazole 200 mg tablet Commonly known as:  DIFLUCAN Take 1 tablet daily for 7 days. ibuprofen 800 mg tablet Commonly known as:  MOTRIN Take 1 Tab by mouth every eight (8) hours as needed for Pain. Take medicine with food. ketorolac 10 mg tablet Commonly known as:  TORADOL Take 1 Tab by mouth every six (6) hours as needed for Pain.  
  
 lidocaine-prilocaine topical cream  
Commonly known as:  EMLA Apply to mediport 1 hour prior to chemo. Place kitchen saran wrap over cream and mediport. This will numb the site.  
  
 magnesium oxide 400 mg tablet Commonly known as:  MAG-OX Take 1 Tab by mouth daily. metroNIDAZOLE 500 mg tablet Commonly known as:  FLAGYL Take 1 tablet daily for 7 days. naloxone 2 mg/actuation Spry Use 1 spray intranasally into 1 nostril. Use a new Narcan nasal spray for subsequent doses and administer into alternating nostrils. May repeat every 2 to 3 minutes as needed. ondansetron hcl 8 mg tablet Commonly known as:  [place] Take one tablet every 8 hours for nausea beginning the night of chemo for 3 days * oxyCODONE 30 mg Tr12 Take 1 po BID  
  
 * oxyCODONE 5 mg capsule Commonly known as:  OXYIR Take 1 Cap by mouth every four (4) hours as needed. Max Daily Amount: 30 mg.  
  
 prochlorperazine 10 mg tablet Commonly known as:  COMPAZINE Take 1 tablet every hour 6 hour with Benadryl 25mg for nausea for 3 days then, as needed. tamoxifen 20 mg tablet Commonly known as:  NOLVADEX Take 1 Tab by mouth daily. * warfarin 1 mg tablet Commonly known as:  COUMADIN Take one tablet by mouth everyday at 6pm or after. You will continue to take this medication EVERYDAY until port is removed. * warfarin 1 mg tablet Commonly known as:  COUMADIN Take three tabs po daily at 6 pm and after or as directed by MD  
  
 * Notice: This list has 4 medication(s) that are the same as other medications prescribed for you. Read the directions carefully, and ask your doctor or other care provider to review them with you. To-Do List   
 04/10/2018 11:00 AM  
  Appointment with [facility] NURSE 3 at [facility] ([phone]) Introducing [facility]! Mami Mckeon introduces mcTEL patient portal. Now you can access parts of your medical record, email your doctor's office, and request medication refills online. 1. In your internet browser, go to [email or web address] 2. Click on the First Time User? Click Here link in the Sign In box. You will see the New Member Sign Up page. 3. Enter your [employer] Access Code exactly as it appears below. You will not need to use this code after youve completed the sign-up process. If you do not sign up before the expiration date, you must request a new code. · [employer] Access Code: [email or web address] Expires: 3/24/2018  2:00 PM 
 
4. Enter the last four digits of your Social Security Number (xxxx) and Date of Birth (mm/dd/yyyy) as indicated and click Submit. You will be taken to the next sign-up page. 5. Create a AKTt ID. This will be your [employer] login ID and cannot be changed, so think of one that is secure and easy to remember. 6. Create a [employer] password. You can change your password at any time. 7. Enter your Password Reset Question and Answer. This can be used at a later time if you forget your password. 8. Enter your e-mail address. You will receive e-mail notification when new information is available in [address] 9. Click Sign Up. You can now view and download portions of your medical record. 10. Click the Download Summary menu link to download a portable copy of your medical information. If you have questions, please visit the Frequently Asked Questions section of the [employer] website. Remember, [employer] is NOT to be used for urgent needs. For medical emergencies, dial 911. Now available from your iPhone and Android! Please provide this summary of care documentation to your next provider. Your primary care clinician is listed as Mack Andujar. If you have any questions after today's visit, please call [phone].

## 2018-03-21 NOTE — PATIENT INSTRUCTIONS
Breast Cancer: Care Instructions  Your Care Instructions    Breast cancer occurs when abnormal cells grow out of control in the breast. These cancer cells can spread within the breast, to nearby lymph nodes and other tissues, and to other parts of the body. Being treated for cancer can weaken your body, and you may feel very tired. Get the rest your body needs so you can feel better. Finding out that you have cancer is scary. You may feel many emotions and may need some help coping. Seek out family, friends, and counselors for support. You also can do things at home to make yourself feel better while you go through treatment. Call the [employer] (7-335.722.9924) or visit its website at [employer] for more information. Follow-up care is a key part of your treatment and safety. Be sure to make and go to all appointments, and call your doctor if you are having problems. It's also a good idea to know your test results and keep a list of the medicines you take. How can you care for yourself at home? · Take your medicines exactly as prescribed. Call your doctor if you think you are having a problem with your medicine. You may get medicine for nausea and vomiting if you have these side effects. · Follow your doctor's instructions to relieve pain. Pain from cancer and surgery can almost always be controlled. Use pain medicine when you first notice pain, before it becomes severe. · Eat healthy food. If you do not feel like eating, try to eat food that has protein and extra calories to keep up your strength and prevent weight loss. Drink liquid meal replacements for extra calories and protein. Try to eat your main meal early. · Get some physical activity every day, but do not get too tired. Keep doing the hobbies you enjoy as your energy allows. · Do not smoke. Smoking can make your cancer worse. If you need help quitting, talk to your doctor about stop-smoking programs and medicines.  These can increase your chances of quitting for good. · Take steps to control your stress and workload. Learn relaxation techniques. ¨ Share your feelings. Stress and tension affect our emotions. By expressing your feelings to others, you may be able to understand and cope with them. ¨ Consider joining a support group. Talking about a problem with your spouse, a good friend, or other people with similar problems is a good way to reduce tension and stress. ¨ Express yourself through art. Try writing, crafts, dance, or art to relieve stress. Some dance, writing, or art groups may be available just for people who have cancer. ¨ Be kind to your body and mind. Getting enough sleep, eating a healthy diet, and taking time to do things you enjoy can contribute to an overall feeling of balance in your life and can help reduce stress. ¨ Get help if you need it. Discuss your concerns with your doctor or counselor. · If you are vomiting or have diarrhea:  ¨ Drink plenty of fluids (enough so that your urine is light yellow or clear like water) to prevent dehydration. Choose water and other caffeine-free clear liquids. If you have kidney, heart, or liver disease and have to limit fluids, talk with your doctor before you increase the amount of fluids you drink. ¨ When you are able to eat, try clear soups, mild foods, and liquids until all symptoms are gone for 12 to 48 hours. Other good choices include dry toast, crackers, cooked cereal, and gelatin dessert, such as Jell-O.  · If you have not already done so, prepare a list of advance directives. Advance directives are instructions to your doctor and family members about what kind of care you want if you become unable to speak or express yourself. When should you call for help? Call 911 anytime you think you may need emergency care. For example, call if:  ? · You passed out (lost consciousness). ?Call your doctor now or seek immediate medical care if:  ? · You have a fever. ? · You have abnormal bleeding. ? · You think you have an infection. ? · You have new or worse pain. ? · You have new symptoms, such as a cough, belly pain, vomiting, diarrhea, or a rash. ? Watch closely for changes in your health, and be sure to contact your doctor if:  ? · You are much more tired than usual.   ? · You have swollen glands in your armpits, groin, or neck. ? · You do not get better as expected. Where can you learn more? Go to http://lalita-emory.info/. Enter V321 in the search box to learn more about \"Breast Cancer: Care Instructions. \"  Current as of: May 12, 2017  Content Version: 11.4  © 9765-0837 [employer]. Care instructions adapted under license by [employer] (which disclaims liability or warranty for this information). If you have questions about a medical condition or this instruction, always ask your healthcare professional. [address] any warranty or liability for your use of this information.

## 2018-04-09 NOTE — TELEPHONE ENCOUNTER
Kenny Logan @ [facility] called to say patient's rehab has been ordered for Home Health and she wants to know if Dr. Lizette Villareal will be signing the plan of care, and she said she really wants to speak to a nurse practitioner. Please call her back at [phone].

## 2018-04-10 NOTE — PROGRESS NOTES
[facility] Progress Note    Date: April 10, 2018    Name: Terra Choudhury              MRN: [id number]              : 1975    Chemotherapy Cycle: C 2 of 8 Keytruda/Procrit injection       Ms. Myla Stearns was assessed and education was provided. Patient arrived, vomiting and c/o pain in her back. After moving from W/C to treatment recliner, vomiting ceased and there was no further c/o of nausea. Instructed patient and  that patient needs to eats toast, soda crackers, and drink plenty of fluids after taking her morning medications and driving to [facility]. This morning she had fried sausage and eggs. Also suggested having flat room temperature Cola to sip to help with nausea. Ms. Jensen's vitals were reviewed. Visit Vitals    /70 (BP 1 Location: Left arm, BP Patient Position: Sitting)    Pulse 96    Temp 98.1 °F (36.7 °C)    Resp 18    Ht 5' 6\" (1.676 m)    Wt 97.9 kg (215 lb 12.8 oz)    SpO2 95%    Breastfeeding No    BMI 34.83 kg/m2     Patient Vitals for the past 12 hrs:   Temp Pulse Resp BP SpO2   04/10/18 1120 98.1 °F (36.7 °C) 96 18 101/70 95 %     Mediport at left upper chest Accessed by KELVIN Nicole w/20 gauge, 1 inch Palacios needle w/o difficulty. Good blood return obtained, labs drawn, followed by 20 ml NS flush. Lab results were obtained and reviewed. Recent Results (from the past 12 hour(s))   CBC WITH 3 PART DIFF    Collection Time: 04/10/18 11:40 AM   Result Value Ref Range    WBC 6.2 4.5 - 13.0 K/uL    RBC 4.00 (L) 4.10 - 5.10 M/uL    HGB 10.9 (L) 12.0 - 16 g/dL    HCT 35.4 (L) 36 - 48 %    MCV 88.5 78 - 102 FL    MCH 27.3 25.0 - 35.0 PG    MCHC 30.8 (L) 31 - 37 g/dL    RDW 17.0 (H) 11.5 - 14.5 %    PLATELET 157 647 - 783 K/uL    NEUTROPHILS 78 (H) 40 - 70 %    MIXED CELLS 12 0.1 - 17 %    LYMPHOCYTES 11 (L) 14 - 44 %    ABS. NEUTROPHILS 4.8 1.8 - 9.5 K/UL    ABS. MIXED CELLS 0.7 0.0 - 2.3 K/uL    ABS.  LYMPHOCYTES 0.7 (L) 1.1 - 5.9 K/UL    DF AUTOMATED     POC CHEM8    Collection Time: 04/10/18 11:42 AM   Result Value Ref Range    CO2, POC 28 (H) 19 - 24 MMOL/L    Glucose,  (H) 74 - 106 MG/DL    BUN, POC 11 7 - 18 MG/DL    Creatinine, POC 0.9 0.6 - 1.3 MG/DL    GFRAA, POC >60 >60 ml/min/1.73m2    GFRNA, POC >60 >60 ml/min/1.73m2    Sodium,  136 - 145 MMOL/L    Potassium, POC 3.4 (L) 3.5 - 5.5 MMOL/L    Calcium, ionized (POC) 1.20 1. 12 - 1.32 mmol/L    Chloride, POC 98 (L) 100 - 108 MMOL/L    Anion gap, POC 15 10 - 20      Hematocrit, POC 37 36 - 49 %    Hemoglobin, POC 12.6 12 - 16 G/DL     Procrit parameters are to hold if Hgb greater than 10 or Hct greater than 30. Today's values are Hgb 10.9/Hct 35.4. Procrit HELD. No pre-medications were ordered and chemotherapy was initiated. Keytruda 200 mg/118 ml was infused at 236 ml/hr. No s/s reaction noted. Patient c/o cramps in legs, abdomen and back as infusion ended. Re-einforced that patient needs to bring her pain medications with her for infusions. Port flushed with 20 ml NS and 5 ml of 100 units/ml Heparin, then de-accessed. No irritation noted at site, band aid applied. Ms. Carmen Guo tolerated infusion, and had no complaints at this time. Armband removed and shredded. Ms. Carmen Guo was discharged from [place] [address] in stable condition at 1320. She is to return on 5/1/5018 at 1100 for her next appointment for labs, Keytruda infusion and Procrit injection.     Maryan Reynoso RN  April 10, 2018  5:47 PM

## 2018-04-10 NOTE — TELEPHONE ENCOUNTER
Jam Peraza calling again, said she really needs to speak to NP about this patient. Re-routing to NP [facility].

## 2018-04-11 NOTE — MR AVS SNAPSHOT
[address] [facility] 
 
 
 [place] , [place] [address] [address] [facility] 
[phone] Patient: Melly Cramer MRN: [id number] :1975 Visit Information Date & Time Provider Department Dept. Phone Encounter #  
 2018 11:15 AM Kandy Domingo MD [facility] Oncology [phone] [id number] Your Appointments 2018 10:15 AM  
Office Visit with Kandy Domingo MD  
[address]  Oncology [address] [facility]) Appt Note: 3 weeks [facility] [address], [place] [address] [facility] [address] [facility], [address] [place] [address] Upcoming Health Maintenance Date Due Pneumococcal 19-64 Highest Risk (1 of 3 - PCV13) 1996 DTaP/Tdap/Td series (1 - Tdap) 1998 PAP AKA CERVICAL CYTOLOGY 1998 Influenza Age 5 to Adult 2017 MEDICARE YEARLY EXAM 3/14/2018 Allergies as of 2018  Review Complete On: 2018 By: Kandy Domingo MD  
  
 Severity Noted Reaction Type Reactions [place]  2015    Itching Shellfish Derived  2015    Swelling Current Immunizations  Reviewed on 2018 No immunizations on file. Not reviewed this visit You Were Diagnosed With   
  
 Codes Comments Carcinoma of breast metastatic to bone, unspecified laterality ([facility] [address]     ICD-10-CM: C50.919, C79.51 
ICD-9-CM: 174.9, 198.5 Vitals BP Pulse Temp Height(growth percentile) Weight(growth percentile) BMI  
 116/82 100 98.4 °F (36.9 °C) 5' 6\" (1.676 m) 216 lb (98 kg) 34.86 kg/m2 Smoking Status Never Smoker BMI and BSA Data Body Mass Index Body Surface Area 34.86 kg/m 2 2.14 m 2 Preferred Pharmacy Pharmacy Name Phone [facility] [address] [facility], [facility] AT [facility] [phone] Your Updated Medication List  
  
   
This list is accurate as of 4/11/18 12:34 PM.  Always use your most recent med list.  
  
  
  
  
 aluminum-magnesium hydroxide 200-200 mg/5 mL susp 5 mL, diphenhydrAMINE 12.5 mg/5 mL liqd 12.5 mg, lidocaine 2 % soln 5 mL  
5 mL by Swish and Spit route two (2) times a day. Magic mouth wash  Maalox Lidocaine 2% viscous  Diphenhydramine oral solution   Pharmacy to mix equal portions of ingredients to a total volume as indicated in the dispense amount. benzonatate 100 mg capsule Commonly known as:  TESSALON Take 100 mg by mouth three (3) times daily as needed for Cough. CARAFATE 100 mg/mL suspension Generic drug:  sucralfate Take 2 tsp by mouth four (4) times daily. diphenhydrAMINE 25 mg tablet Commonly known as:  BENADRYL ALLERGY Take with 1 compazine every 6 hours for nausea for 3 days then as needed. diphenoxylate-atropine 2.5-0.025 mg per tablet Commonly known as:  LOMOTIL Take 1 tablet as needed for Diarrhea  
  
 enoxaparin 100 mg/mL Commonly known as:  LOVENOX  
140 mg by SubCUTAneous route daily. Indications: Acute Pulmonary Thromboembolism, deep venous thrombosis  
  
 famotidine 20 mg tablet Commonly known as:  PEPCID Take 1 Tab by mouth two (2) times a day. Indications: gastroesophageal reflux disease, Heartburn  
  
 ibuprofen 800 mg tablet Commonly known as:  MOTRIN Take 1 Tab by mouth every eight (8) hours as needed for Pain. Take medicine with food. ketorolac 10 mg tablet Commonly known as:  TORADOL Take 1 Tab by mouth every six (6) hours as needed for Pain.  
  
 lidocaine-prilocaine topical cream  
Commonly known as:  EMLA Apply to mediport 1 hour prior to chemo. Place kitchen saran wrap over cream and mediport. This will numb the site.  
  
 magnesium oxide 400 mg tablet Commonly known as:  MAG-OX Take 1 Tab by mouth daily. metroNIDAZOLE 500 mg tablet Commonly known as:  FLAGYL Take 1 tablet daily for 7 days. ondansetron hcl 8 mg tablet Commonly known as:  Zannie Alto Take one tablet every 8 hours for nausea beginning the night of chemo for 3 days * oxyCODONE 30 mg Tr12 Take 1 po BID  
  
 * oxyCODONE IR 15 mg immediate release tablet Commonly known as:  OXY-IR Take 1 Tab by mouth every four (4) hours as needed for Pain. Max Daily Amount: 90 mg.  
  
 prochlorperazine 10 mg tablet Commonly known as:  COMPAZINE Take 1 tablet every hour 6 hour with Benadryl 25mg for nausea for 3 days then, as needed. REGLAN 10 mg tablet Generic drug:  metoclopramide HCl Take 20 mg by mouth Before breakfast, lunch, and dinner. * Notice: This list has 2 medication(s) that are the same as other medications prescribed for you. Read the directions carefully, and ask your doctor or other care provider to review them with you. To-Do List   
 05/01/2018 11:00 AM  
  Appointment with [facility] INFUSION NURSE 2 at [facility] OP INFUSION ([phone]) Introducing [facility]! Lyndsey Burleson introduces [employer] patient portal. Now you can access parts of your medical record, email your doctor's office, and request medication refills online. 1. In your internet browser, go to [email or web address] 2. Click on the First Time User? Click Here link in the Sign In box. You will see the New Member Sign Up page. 3. Enter your [employer] Access Code exactly as it appears below. You will not need to use this code after youve completed the sign-up process. If you do not sign up before the expiration date, you must request a new code. · [employer] Access Code: [email or web address] Expires: 6/24/2018 11:03 AM 
 
4. Enter the last four digits of your Social Security Number (xxxx) and Date of Birth (mm/dd/yyyy) as indicated and click Submit. You will be taken to the next sign-up page. 5. Create a [employer] ID.  This will be your [employer] login ID and cannot be changed, so think of one that is secure and easy to remember. 6. Create a [employer] password. You can change your password at any time. 7. Enter your Password Reset Question and Answer. This can be used at a later time if you forget your password. 8. Enter your e-mail address. You will receive e-mail notification when new information is available in [address] 9. Click Sign Up. You can now view and download portions of your medical record. 10. Click the Download Summary menu link to download a portable copy of your medical information. If you have questions, please visit the Frequently Asked Questions section of the [employer] website. Remember, [employer] is NOT to be used for urgent needs. For medical emergencies, dial 911. Now available from your iPhone and Android! Please provide this summary of care documentation to your next provider. Your primary care clinician is listed as Saqib Beal. If you have any questions after today's visit, please call [phone].

## 2018-04-11 NOTE — TELEPHONE ENCOUNTER
Sharon Schaefer at [facility] called regarding patient, she needs to know what are the plans for the patient and her prognosis. She said please call her back at [phone].

## 2018-04-11 NOTE — PROGRESS NOTES
Hematology/Oncology  Progress Note    Name: Rusty Mas  Date: 2018  : 1975    PCP: Mick Wray NP     Ms. Daljit Arredondo is a 38 y.o.  female who was seen for regimen of her metastatic breast cancer. She also has pulmonary emboli  Current therapy: Coumadin    Subjective:     Ms. Kamila Richard is a 62-year-old -American woman who has ER/AR positive and HER-2 positive breast cancer. She completed a full course of adjuvant systemic chemotherapy with carboplatin and Taxotere, Perjeda and Herceptin. Recently she was found to have metastatic disease. She was also hospitalized recently with venous thromboembolism and is now on anticoagulation therapy, with subcutaneous Lovenox. The patient recently got authorization to begin immunotherapy with Keytruda and received her 2nd infusion yesterday. She tolerated the treatment well. The patient reports pain in right shoulder over the past 4 days. She believes she may have slept on the shoulder. She also reports SOB, she is using oxygen via NC at 2LPM around the clock. The patient also reports she went to the ER a few days ago for the shoulder pain and xray did not reveal any abnormalities. She also had a doppler which revealed occlusive DVT. Chest CT was negative for PE but positive for pleural effusion. She has been started on diuretics. .    Past medical history, family history, and social history: these were reviewed and remains unchanged. No past medical history on file. Past Surgical History:   Procedure Laterality Date    HX BREAST BIOPSY      HX CHOLECYSTECTOMY      HX TUBAL LIGATION       Social History     Social History    Marital status:      Spouse name: N/A    Number of children: N/A    Years of education: N/A     Occupational History    Not on file.      Social History Main Topics    Smoking status: Never Smoker    Smokeless tobacco: Never Used    Alcohol use No    Drug use: No    Sexual activity: Yes     Other Topics Concern    Not on file     Social History Narrative     Family History   Problem Relation Age of Onset    Cancer Brother     Diabetes Maternal Grandmother     Diabetes Maternal Grandfather     Diabetes Paternal Grandmother     Diabetes Paternal Grandfather      Current Outpatient Prescriptions   Medication Sig Dispense Refill    LORazepam (ATIVAN) 0.5 mg tablet Take 1 Tab by mouth every six (6) hours as needed for Anxiety (may take 2 tabs if needed). Max Daily Amount: 2 mg. Indications: anxiety 30 Tab 0    sucralfate (CARAFATE) 100 mg/mL suspension Take 10 mL by mouth four (4) times daily. 120 mL 0    famotidine (PEPCID) 20 mg tablet Take 1 Tab by mouth two (2) times a day. Indications: gastroesophageal reflux disease, Heartburn 60 Tab 0    benzonatate (TESSALON) 100 mg capsule Take 100 mg by mouth three (3) times daily as needed for Cough.  metoclopramide HCl (REGLAN) 10 mg tablet Take 20 mg by mouth Before breakfast, lunch, and dinner.  oxyCODONE IR (OXY-IR) 15 mg immediate release tablet Take 1 Tab by mouth every four (4) hours as needed for Pain. Max Daily Amount: 90 mg. 120 Tab 0    ketorolac (TORADOL) 10 mg tablet Take 1 Tab by mouth every six (6) hours as needed for Pain. 120 Tab 2    oxyCODONE ER 30 mg TR12 Take 1 po BID 60 Tab 0    enoxaparin (LOVENOX) 100 mg/mL 140 mg by SubCUTAneous route daily. Indications: Acute Pulmonary Thromboembolism, deep venous thrombosis 30 Syringe 5    magnesium oxide (MAG-OX) 400 mg tablet Take 1 Tab by mouth daily. 60 Tab 1    ondansetron hcl (ZOFRAN) 8 mg tablet Take one tablet every 8 hours for nausea beginning the night of chemo for 3 days 9 Tab 6    lidocaine-prilocaine (EMLA) topical cream Apply to mediport 1 hour prior to chemo. Place kitchen saran wrap over cream and mediport. This will numb the site. 30 g 6    prochlorperazine (COMPAZINE) 10 mg tablet Take 1 tablet every hour 6 hour with Benadryl 25mg for nausea for 3 days then, as needed.  60 Tab 6    diphenhydrAMINE (BENADRYL ALLERGY) 25 mg tablet Take with 1 compazine every 6 hours for nausea for 3 days then as needed. 60 Tab 6    ibuprofen (MOTRIN) 800 mg tablet Take 1 Tab by mouth every eight (8) hours as needed for Pain. Take medicine with food. 90 Tab 2    diphenoxylate-atropine (LOMOTIL) 2.5-0.025 mg per tablet Take 1 tablet as needed for Diarrhea 30 Tab 3    metroNIDAZOLE (FLAGYL) 500 mg tablet Take 1 tablet daily for 7 days. 7 Tab 0    aluminum-magnesium hydroxide 200-200 mg/5 mL susp 5 mL, diphenhydrAMINE 12.5 mg/5 mL liqd 12.5 mg, lidocaine 2 % soln 5 mL 5 mL by Swish and Spit route two (2) times a day. Magic mouth wash   Maalox  Lidocaine 2% viscous   Diphenhydramine oral solution     Pharmacy to mix equal portions of ingredients to a total volume as indicated in the dispense amount. 120 mL 2     Facility-Administered Medications Ordered in Other Visits   Medication Dose Route Frequency Provider Last Rate Last Dose    heparin (porcine) 100 unit/mL injection 500 Units  500 Units IntraVENous PRN Rozina Marsh MD   500 Units at 04/10/18 1311    sodium chloride (NS) flush 10-40 mL  10-40 mL IntraVENous PRN Rozina Marsh MD   20 mL at 04/10/18 1310    sodium chloride (NS) flush 10-40 mL  10-40 mL IntraVENous PRN Rozina Marsh MD           Review of Systems  Constitutional: The patient complains of fatigue and weakness  HEENT: The patient denies recent head trauma, eye pain, blurred vision,  hearing deficit, oropharyngeal mucosal pain or lesions, and the patient denies throat pain or discomfort. Lymphatics: The patient denies palpable peripheral lymphadenopathy. Hematologic: The patient denies having bruising, bleeding, or progressive fatigue. Respiratory: Patient complains of shortness of breath. She wears 2LPM oxygen 24hrs/day. She denies cough, sputum production, fever, or dyspnea on exertion. Cardiovascular: The patient complains of mild chest pain on deep inspiration.  She was found to have pleural effusion. She denies having leg pain, leg swelling, heart palpitations, chest permit, chest pain, or lightheadedness. The patient denies having dyspnea on exertion. Gastrointestinal: The patient denies having nausea, emesis, or diarrhea. The patient denies having any hematemesis or blood in the stool. Genitourinary: Patient denies having urinary urgency, frequency, or dysuria. The patient denies having blood in the urine. Psychological: The patient denies having symptoms of nervousness, anxiety, depression, or thoughts of harming self. Skin: Patient denies having skin rashes, skin, ulcerations, or unexplained itching or pruritus. Musculoskeletal: The patient complains of right shoulder pain, recent xray negative for fracture. She has DVT in right arm      Objective:     Visit Vitals    /82    Pulse 100    Temp 98.4 °F (36.9 °C)    Ht 5' 6\" (1.676 m)    Wt 98 kg (216 lb)    BMI 34.86 kg/m2     ECOG PS=0; Pain score=5-6/10    Physical Exam:   Gen. Appearance: The patient is in no acute distress. Skin: There is no bruise or rash. HEENT: The exam is unremarkable. Neck: Supple without lymphadenopathy or thyromegaly. Lungs: Diminished throughout all lung fields. there are no wheezes or rhonchi. Heart: tachycardic , there are no murmurs, gallops, or rubs. Abdomen: Bowel sounds are present and normal.  There is no guarding, tenderness, or hepatosplenomegaly. Extremities: generalized edema There is no clubbing, cyanosis. Neurologic: There are no focal neurologic deficits. Lymphatics: There is no palpable peripheral lymphadenopathy. Musculoskeletal: Decreased ROM in RUE. There is focal joint tenderness. Psychological/psychiatric: There is no clinical evidence of anxiety, depression, or melancholy.     Lab data:      Results for orders placed or performed during the hospital encounter of 04/10/18   CBC WITH 3 PART DIFF     Status: Abnormal   Result Value Ref Range Status    WBC 6.2 4.5 - 13.0 K/uL Final    RBC 4.00 (L) 4.10 - 5.10 M/uL Final    HGB 10.9 (L) 12.0 - 16 g/dL Final    HCT 35.4 (L) 36 - 48 % Final    MCV 88.5 78 - 102 FL Final    MCH 27.3 25.0 - 35.0 PG Final    MCHC 30.8 (L) 31 - 37 g/dL Final    RDW 17.0 (H) 11.5 - 14.5 % Final    PLATELET 701 354 - 537 K/uL Final    NEUTROPHILS 78 (H) 40 - 70 % Final    MIXED CELLS 12 0.1 - 17 % Final    LYMPHOCYTES 11 (L) 14 - 44 % Final    ABS. NEUTROPHILS 4.8 1.8 - 9.5 K/UL Final    ABS. MIXED CELLS 0.7 0.0 - 2.3 K/uL Final    ABS. LYMPHOCYTES 0.7 (L) 1.1 - 5.9 K/UL Final     Comment: Test performed at [address]. Results Reviewed by Medical Director. DF AUTOMATED   Final           Assessment:     1. Anxiety    2. Carcinoma of breast metastatic to bone, unspecified laterality ([address]    3. Bilateral pulmonary embolism ([facility] [address]    4. Antineoplastic chemotherapy induced anemia    5. Cancer associated pain      Plan:   Metastatic breast cancer: The patient is now receiving Keytruda at a fixed dose of 200 mg every 3 weeks. She is tolerating the initial treatments reasonably well. Therapeutic intervention with this immunotherapeutic modality will be continued. Chemotherapy-induced anemia: I explained to the patient that her CBC from 4/10/2018 showed that her WBC count was 6.2, hemoglobin was 10.9 g/dL, hematocrit 35.4%, and a platelet count was [phone]. Subcutaneous Procrit will be offered if her hemoglobin remains below 10 and if her hematocrit declines below 30%. Cancer Associated pain: The patient will continue her current treatment regimen with Qxycontin IR, Oxycodone ER, and lidocaine patches. She received a refill on her IR prescription today. Bilateral pulmonary embolism/DVT RUE: The patient is currently on weight-based Lovenox at 1 mg/kg. She will need to remain on this medication indefinitely due to the hypercoagulable state caused by her metastatic breast cancer.     Anxiety: renewed Rx for Ativan 0.5mg, with instructions to take 1-2 tabs every 6 hrs as needed. Follow-up in 3 weeks. Orders Placed This Encounter    LORazepam (ATIVAN) 0.5 mg tablet     Sig: Take 1 Tab by mouth every six (6) hours as needed for Anxiety (may take 2 tabs if needed). Max Daily Amount: 2 mg. Indications: anxiety     Dispense:  30 Tab     Refill:  0    sucralfate (CARAFATE) 100 mg/mL suspension     Sig: Take 10 mL by mouth four (4) times daily. Dispense:  120 mL     Refill:  0    famotidine (PEPCID) 20 mg tablet     Sig: Take 1 Tab by mouth two (2) times a day. Indications: gastroesophageal reflux disease, Heartburn     Dispense:  60 Tab     Refill:  0       Aspen Dale NP  4/11/2018     I have assessed the patient independently and  agree with the full assessment as outlined. Chelly Huang MD, FACP      Please note: This document has been produced using voice recognition software. Unrecognized errors in transcription may be present.

## 2018-05-07 NOTE — TELEPHONE ENCOUNTER
Felicity @ [employer] called, she said she received orders, but they are not signed. Please call her back at [phone] ext [phone].

## 2018-05-07 NOTE — PROGRESS NOTES
Spoke with Kartik Elliott and told her to refax order that Dr. Reggie Webb needs to sign.  I will have him sign the order and fax it back ASAP

## 2018-05-14 PROBLEM — J91.0 MALIGNANT PLEURAL EFFUSION: Status: ACTIVE | Noted: 2018-01-01

## 2018-05-14 PROBLEM — F41.9 ANXIETY: Status: ACTIVE | Noted: 2018-01-01

## 2018-05-14 NOTE — MR AVS SNAPSHOT
[address] [facility] 
 
 
 [address], [place] [address] 200 [facility] 
[phone] Patient: Rusty Jones MRN: [id number] :1975 Visit Information Date & Time Provider Department Dept. Phone Encounter #  
 2018  1:15 PM Frances Rg MD [address] [facility]  Oncology [phone] [id number] Your Appointments 2018  1:45 PM  
Office Visit with Frances Rg MD  
[facility] [facility] On[facility]) Appt Note: 4 weeks [facility], [place] [address] [place] [address] 3200 [facility], [address] [place] [address] 200 [facility] Upcoming Health Maintenance Date Due Pneumococcal 19-64 Highest Risk (1 of 3 - PCV13) 1996 DTaP/Tdap/Td series (1 - Tdap) 1998 PAP AKA CERVICAL CYTOLOGY 1998 MEDICARE YEARLY EXAM 3/14/2018 Influenza Age 5 to Adult 2018 Allergies as of 2018  Review Complete On: 2018 By: Frances Rg MD  
  
 Severity Noted Reaction Type Reactions Jason  2015    Itching Shellfish Derived  2015    Swelling Current Immunizations  Reviewed on 2018 No immunizations on file. Not reviewed this visit You Were Diagnosed With   
  
 Codes Comments Metastatic breast cancer ([facility] [address]    -  Primary ICD-10-CM: I31.114 ICD-9-CM: 174.9 Vitals BP Pulse Temp Weight(growth percentile) BMI Smoking Status 107/74 96 98.3 °F (36.8 °C) 216 lb (98 kg) 34.86 kg/m2 Never Smoker BMI and BSA Data Body Mass Index Body Surface Area 34.86 kg/m 2 2.14 m 2 Preferred Pharmacy Pharmacy Name Phone [facility] [address] [facility], [facility] AT [facility] [phone] Your Updated Medication List  
  
   
This list is accurate as of 18  2:14 PM.  Always use your most recent med list.  
  
  
  
  
 aluminum-magnesium hydroxide 200-200 mg/5 mL susp 5 mL, diphenhydrAMINE 12.5 mg/5 mL liqd 12.5 mg, lidocaine 2 % soln 5 mL  
5 mL by Swish and Spit route two (2) times a day. Magic mouth wash  Maalox Lidocaine 2% viscous  Diphenhydramine oral solution   Pharmacy to mix equal portions of ingredients to a total volume as indicated in the dispense amount. b complex-vitamin c-folic acid 1 mg capsule Commonly known as:  Maryjean Pester Take 1 Cap by mouth daily. benzonatate 100 mg capsule Commonly known as:  TESSALON Take 100 mg by mouth three (3) times daily as needed for Cough. diphenhydrAMINE 25 mg tablet Commonly known as:  BENADRYL ALLERGY Take with 1 compazine every 6 hours for nausea for 3 days then as needed. diphenoxylate-atropine 2.5-0.025 mg per tablet Commonly known as:  LOMOTIL Take 1 tablet as needed for Diarrhea  
  
 enoxaparin 100 mg/mL Commonly known as:  LOVENOX  
140 mg by SubCUTAneous route daily. Indications: Acute Pulmonary Thromboembolism, deep venous thrombosis  
  
 ergocalciferol 50,000 unit capsule Commonly known as:  ERGOCALCIFEROL Take 1 Cap by mouth every seven (7) days. famotidine 20 mg tablet Commonly known as:  PEPCID Take 1 Tab by mouth two (2) times a day. Indications: gastroesophageal reflux disease, Heartburn  
  
 ibuprofen 800 mg tablet Commonly known as:  MOTRIN Take 1 Tab by mouth every eight (8) hours as needed for Pain. Take medicine with food. ketorolac 10 mg tablet Commonly known as:  TORADOL Take 1 Tab by mouth every six (6) hours as needed for Pain.  
  
 lidocaine-prilocaine topical cream  
Commonly known as:  EMLA Apply to mediport 1 hour prior to chemo. Place kitchen saran wrap over cream and mediport. This will numb the site. LORazepam 0.5 mg tablet Commonly known as:  ATIVAN Take 1 Tab by mouth every six (6) hours as needed for Anxiety (may take 2 tabs if needed). Max Daily Amount: 2 mg. Indications: anxiety  
  
 magnesium oxide 400 mg tablet Commonly known as:  MAG-OX Take 1 Tab by mouth daily. metoclopramide HCl 10 mg tablet Commonly known as:  REGLAN Take 2 Tabs by mouth Before breakfast, lunch, and dinner. metoprolol succinate 50 mg XL tablet Commonly known as:  TOPROL-XL Take 1 Tab by mouth daily. metroNIDAZOLE 500 mg tablet Commonly known as:  FLAGYL Take 1 tablet daily for 7 days. ondansetron hcl 8 mg tablet Commonly known as:  Gavin Peat Take one tablet every 8 hours for nausea beginning the night of chemo for 3 days * oxyCODONE 30 mg Tr12 Take 1 po BID  
  
 * oxyCODONE IR 15 mg immediate release tablet Commonly known as:  OXY-IR Take 1 Tab by mouth every four (4) hours as needed for Pain. Max Daily Amount: 90 mg.  
  
 potassium chloride 20 mEq tablet Commonly known as:  K-DUR, KLOR-CON Take 1 Tab by mouth daily. prochlorperazine 10 mg tablet Commonly known as:  COMPAZINE Take 1 tablet every hour 6 hour with Benadryl 25mg for nausea for 3 days then, as needed. sucralfate 100 mg/mL suspension Commonly known as:  Sharona Sers Take 10 mL by mouth four (4) times daily. * Notice: This list has 2 medication(s) that are the same as other medications prescribed for you. Read the directions carefully, and ask your doctor or other care provider to review them with you. We Performed the Following COMPLETE CBC & AUTO DIFF WBC [92763 CPT(R)] To-Do List   
 05/14/2018 Lab:  CBC WITH 3 PART DIFF   
  
 05/15/2018  1:00 PM  
  Appointment with HBV INFUSION NURSE 1 at [facility] ([phone]) Introducing [facility] HE[facility]! Brian Lester introduces [employer] patient portal. Now you can access parts of your medical record, email your doctor's office, and request medication refills online.    
 
1. In your internet browser, go to [email or web address] 2. Click on the First Time User? Click Here link in the Sign In box. You will see the New Member Sign Up page. 3. Enter your Certain Access Code exactly as it appears below. You will not need to use this code after youve completed the sign-up process. If you do not sign up before the expiration date, you must request a new code. · Certain Access Code: [email or web address] Expires: 6/24/2018 11:03 AM 
 
4. Enter the last four digits of your Social Security Number (xxxx) and Date of Birth (mm/dd/yyyy) as indicated and click Submit. You will be taken to the next sign-up page. 5. Create a [place] ID. This will be your Certain login ID and cannot be changed, so think of one that is secure and easy to remember. 6. Create a Certain password. You can change your password at any time. 7. Enter your Password Reset Question and Answer. This can be used at a later time if you forget your password. 8. Enter your e-mail address. You will receive e-mail notification when new information is available in [address] 9. Click Sign Up. You can now view and download portions of your medical record. 10. Click the Download Summary menu link to download a portable copy of your medical information. If you have questions, please visit the Frequently Asked Questions section of the Certain website. Remember, Certain is NOT to be used for urgent needs. For medical emergencies, dial 911. Now available from your iPhone and Android! Please provide this summary of care documentation to your next provider. Your primary care clinician is listed as Yeny Perez. If you have any questions after today's visit, please call [phone].

## 2018-05-14 NOTE — TELEPHONE ENCOUNTER
Colletta Lovett at [address] regarding labs. Patient resumed home health care on Saturday and she wants to clarify the orders for the patient's labs.     Please call Lizzette back at [phone]

## 2018-05-14 NOTE — PROGRESS NOTES
Hematology/Oncology  Progress Note    Name: Sanjuanita Bourgeois  Date: 2018  : 1975    PCP: Perri Quintanilla NP     Ms. Cori Hurd is a 38 y.o.  female who was seen for regimen of her metastatic breast cancer. She also has pulmonary emboli  Current therapy: Coumadin    Subjective:     Ms. Silvia Alicea is a 46-year-old -American woman who has ER/NV positive and HER-2 positive breast cancer. She completed a full course of adjuvant systemic chemotherapy with carboplatin and Taxotere, Perjeda and Herceptin. Recently she was found to have metastatic disease. She was recently hospitalized with progressive shortness of breath and was found to have a large pleural effusion. Drainage of the effusion revealed a malignant was positive within the pleural fluid. She is now on supplemental oxygen around-the-clock. She is essentially wheelchair confined but is able to ambulate for short distances. Today she is not complaining of any pain. She is hopeful that she will be able to restart her immunotherapy later this week. Past medical history, family history, and social history: these were reviewed and remains unchanged. No past medical history on file. Past Surgical History:   Procedure Laterality Date    HX BREAST BIOPSY      HX CHOLECYSTECTOMY      HX TUBAL LIGATION       Social History     Social History    Marital status:      Spouse name: N/A    Number of children: N/A    Years of education: N/A     Occupational History    Not on file.      Social History Main Topics    Smoking status: Never Smoker    Smokeless tobacco: Never Used    Alcohol use No    Drug use: No    Sexual activity: Yes     Other Topics Concern    Not on file     Social History Narrative     Family History   Problem Relation Age of Onset    Cancer Brother     Diabetes Maternal Grandmother     Diabetes Maternal Grandfather     Diabetes Paternal Grandmother     Diabetes Paternal Grandfather      Current Outpatient Prescriptions   Medication Sig Dispense Refill    metoclopramide HCl (REGLAN) 10 mg tablet Take 2 Tabs by mouth Before breakfast, lunch, and dinner. 90 Tab 3    oxyCODONE IR (OXY-IR) 15 mg immediate release tablet Take 1 Tab by mouth every four (4) hours as needed for Pain. Max Daily Amount: 90 mg. 120 Tab 0    LORazepam (ATIVAN) 0.5 mg tablet Take 1 Tab by mouth every six (6) hours as needed for Anxiety (may take 2 tabs if needed). Max Daily Amount: 2 mg. Indications: anxiety 30 Tab 0    sucralfate (CARAFATE) 100 mg/mL suspension Take 10 mL by mouth four (4) times daily. 120 mL 0    famotidine (PEPCID) 20 mg tablet Take 1 Tab by mouth two (2) times a day. Indications: gastroesophageal reflux disease, Heartburn 60 Tab 0    metoprolol succinate (TOPROL-XL) 50 mg XL tablet Take 1 Tab by mouth daily. 60 Tab 0    potassium chloride (K-DUR, KLOR-CON) 20 mEq tablet Take 1 Tab by mouth daily. 30 Tab 0    b complex-vitamin c-folic acid (NEPHROCAPS) 1 mg capsule Take 1 Cap by mouth daily. 30 Cap 0    ergocalciferol (ERGOCALCIFEROL) 50,000 unit capsule Take 1 Cap by mouth every seven (7) days. 12 Cap 2    benzonatate (TESSALON) 100 mg capsule Take 100 mg by mouth three (3) times daily as needed for Cough.  ketorolac (TORADOL) 10 mg tablet Take 1 Tab by mouth every six (6) hours as needed for Pain. 120 Tab 2    oxyCODONE ER 30 mg TR12 Take 1 po BID 60 Tab 0    enoxaparin (LOVENOX) 100 mg/mL 140 mg by SubCUTAneous route daily. Indications: Acute Pulmonary Thromboembolism, deep venous thrombosis 30 Syringe 5    magnesium oxide (MAG-OX) 400 mg tablet Take 1 Tab by mouth daily. 60 Tab 1    ondansetron hcl (ZOFRAN) 8 mg tablet Take one tablet every 8 hours for nausea beginning the night of chemo for 3 days 9 Tab 6    lidocaine-prilocaine (EMLA) topical cream Apply to mediport 1 hour prior to chemo. Place kitchen saran wrap over cream and mediport. This will numb the site.  30 g 6    prochlorperazine (COMPAZINE) 10 mg tablet Take 1 tablet every hour 6 hour with Benadryl 25mg for nausea for 3 days then, as needed. 60 Tab 6    diphenhydrAMINE (BENADRYL ALLERGY) 25 mg tablet Take with 1 compazine every 6 hours for nausea for 3 days then as needed. 60 Tab 6    ibuprofen (MOTRIN) 800 mg tablet Take 1 Tab by mouth every eight (8) hours as needed for Pain. Take medicine with food. 90 Tab 2    diphenoxylate-atropine (LOMOTIL) 2.5-0.025 mg per tablet Take 1 tablet as needed for Diarrhea 30 Tab 3    metroNIDAZOLE (FLAGYL) 500 mg tablet Take 1 tablet daily for 7 days. 7 Tab 0    aluminum-magnesium hydroxide 200-200 mg/5 mL susp 5 mL, diphenhydrAMINE 12.5 mg/5 mL liqd 12.5 mg, lidocaine 2 % soln 5 mL 5 mL by Swish and Spit route two (2) times a day. Magic mouth wash   Maalox  Lidocaine 2% viscous   Diphenhydramine oral solution     Pharmacy to mix equal portions of ingredients to a total volume as indicated in the dispense amount. 120 mL 2     Facility-Administered Medications Ordered in Other Visits   Medication Dose Route Frequency Provider Last Rate Last Dose    [START ON 5/15/2018] pembrolizumab (Reyna Davidson) 200 mg in 0.9% sodium chloride 100 mL IVPB  200 mg IntraVENous William Sevilla MD           Review of Systems  Constitutional: The patient complains of fatigue and weakness  HEENT: The patient denies recent head trauma, eye pain, blurred vision,  hearing deficit, oropharyngeal mucosal pain or lesions, and the patient denies throat pain or discomfort. Lymphatics: The patient denies palpable peripheral lymphadenopathy. Hematologic: The patient denies having bruising, bleeding, or progressive fatigue. Respiratory: Patient complains of shortness of breath. She wears 2LPM oxygen 24hrs/day. She denies cough, sputum production, or fever. She does have dyspnea on exertion and is continued to use of supplemental oxygen around-the-clock. Cardiovascular: The patient complains of mild chest pain on deep inspiration.  She was found to have pleural effusion. She denies having leg pain, leg swelling, heart palpitations, chest permit, chest pain, or lightheadedness. The patient denies having dyspnea on exertion. Gastrointestinal: The patient denies having nausea, emesis, or diarrhea. The patient denies having any hematemesis or blood in the stool. Genitourinary: Patient denies having urinary urgency, frequency, or dysuria. The patient denies having blood in the urine. Psychological: The patient denies having symptoms of nervousness, anxiety, depression, or thoughts of harming self. Skin: Patient denies having skin rashes, skin, ulcerations, or unexplained itching or pruritus. Musculoskeletal: The patient complains of right shoulder pain, recent xray negative for fracture. She has DVT in right arm      Objective:     Visit Vitals    /74    Pulse 96    Temp 98.3 °F (36.8 °C)    Wt 98 kg (216 lb)    BMI 34.86 kg/m2     ECOG PS=0; Pain score=5-6/10    Physical Exam:   Gen. Appearance: The patient is in no acute distress. Skin: There is no bruise or rash. HEENT: The exam is unremarkable. Neck: Supple without lymphadenopathy or thyromegaly. Lungs: Diminished throughout all lung fields. there are no wheezes or rhonchi. Heart: tachycardic , there are no murmurs, gallops, or rubs. Abdomen: Bowel sounds are present and normal.  There is no guarding, tenderness, or hepatosplenomegaly. Extremities: generalized edema There is no clubbing, cyanosis. Neurologic: There are no focal neurologic deficits. Lymphatics: There is no palpable peripheral lymphadenopathy. Musculoskeletal: Decreased ROM in RUE. There is focal joint tenderness. Psychological/psychiatric: There is no clinical evidence of anxiety, depression, or melancholy.     Lab data:      Results for orders placed or performed during the hospital encounter of 04/10/18   CBC WITH 3 PART DIFF     Status: Abnormal   Result Value Ref Range Status    WBC 6.2 4.5 - 13.0 K/uL Final    RBC 4.00 (L) 4.10 - 5.10 M/uL Final    HGB 10.9 (L) 12.0 - 16 g/dL Final    HCT 35.4 (L) 36 - 48 % Final    MCV 88.5 78 - 102 FL Final    MCH 27.3 25.0 - 35.0 PG Final    MCHC 30.8 (L) 31 - 37 g/dL Final    RDW 17.0 (H) 11.5 - 14.5 % Final    PLATELET 151 103 - 012 K/uL Final    NEUTROPHILS 78 (H) 40 - 70 % Final    MIXED CELLS 12 0.1 - 17 % Final    LYMPHOCYTES 11 (L) 14 - 44 % Final    ABS. NEUTROPHILS 4.8 1.8 - 9.5 K/UL Final    ABS. MIXED CELLS 0.7 0.0 - 2.3 K/uL Final    ABS. LYMPHOCYTES 0.7 (L) 1.1 - 5.9 K/UL Final     Comment: Test performed at [place] [address] Location. Results Reviewed by Medical Director. DF AUTOMATED   Final           Assessment:     1. Metastatic breast cancer ([facility] [address]    2. Cancer associated pain    3. Bilateral pulmonary embolism ([facility] [address]    4. Malignant pleural effusion    5. Anxiety      Plan:   Metastatic breast cancer: The patient is now receiving Keytruda at a fixed dose of 200 mg every 3 weeks. We will plan to resume therapy at 1 PM on 5/16/2018. Lab tests which will include a CBC, comprehensive metabolic panel, and [id number] level will be drawn at the time of therapy through her Mediport. Chemotherapy-induced anemia: I explained to the patient that her CBC from 4 5/11/2018, while, showed a WBC count 7.3, hematocrit 32.8%, and the platelet count was 699,155. Subcutaneous Procrit will be offered if her hemoglobin remains below 10 and if her hematocrit declines below 30%. Cancer Associated pain: The patient will continue her current treatment regimen with Qxycontin IR, Oxycodone ER, and lidocaine patches. She received a refill on her IR prescription today. Bilateral pulmonary embolism/DVT RUE: The patient is currently on weight-based Lovenox at 1 mg/kg. She will need to remain on this medication indefinitely due to the hypercoagulable state caused by her metastatic breast cancer.     Pleural effusion: I have explained to the patient that the cytology from a pleural effusion was positive for malignant cells. Anxiety: renewed Rx for Ativan 0.5mg, with instructions to take 1-2 tabs every 6 hrs as needed. Follow-up in 3 weeks. Orders Placed This Encounter    COMPLETE CBC & AUTO DIFF WBC    InHouse CBC (New KCBX)     Standing Status:   Future     Number of Occurrences:   1     Standing Expiration Date:   5/21/2018       Kandy Domingo MD  5/14/2018     I have assessed the patient independently and  agree with the full assessment as outlined. Tamica Schaefer MD, FACP      Please note: This document has been produced using voice recognition software. Unrecognized errors in transcription may be present.

## 2018-05-15 NOTE — PROGRESS NOTES
[facility] Progress Note    Date: May 15, 2018    Name: Meme Ramírez    MRN: [id number]         : 1975      Ms. Brenda Cobb arrived to [facility] at [phone] via wheelchair. Pt is here today for chemotherapy, Keytruda, Cycle 3. Ms. Brenda Cobb was assessed and education was provided. Patient is on 2.5 L oxygen via nasal cannula. She denies any other complications today. Patient rated her chest and back pain today at a 7/10. ÁLVARO Almaraz NP spoke to patient about getting a refill on her fentanyl patches. Ms. Jensen's vitals were reviewed. Visit Vitals    /74 (BP 1 Location: Left arm, BP Patient Position: Sitting)    Pulse (!) 102    Temp 98.2 °F (36.8 °C)    Resp 18    Ht 5' 6\" (1.676 m)    Wt 98.9 kg (218 lb)    SpO2 96%    Breastfeeding No    BMI 35.19 kg/m2       Patient's left upper chest port accessed and blood drawn for labs. Lab results were obtained and reviewed. Labs okay for chemo. Recent Results (from the past 12 hour(s))   CBC WITH 3 PART DIFF    Collection Time: 05/15/18  1:52 PM   Result Value Ref Range    WBC 9.2 4.5 - 13.0 K/uL    RBC 4.13 4.10 - 5.10 M/uL    HGB 11.0 (L) 12.0 - 16 g/dL    HCT 34.9 (L) 36 - 48 %    MCV 84.5 78 - 102 FL    MCH 26.6 25.0 - 35.0 PG    MCHC 31.5 31 - 37 g/dL    RDW 18.0 (H) 11.5 - 14.5 %    PLATELET 659 326 - 013 K/uL    NEUTROPHILS 92 (H) 40 - 70 %    MIXED CELLS 3 0.1 - 17 %    LYMPHOCYTES 6 (L) 14 - 44 %    ABS. NEUTROPHILS 8.5 1.8 - 9.5 K/UL    ABS. MIXED CELLS 0.2 0.0 - 2.3 K/uL    ABS.  LYMPHOCYTES 0.5 (L) 1.1 - 5.9 K/UL    DF AUTOMATED     POC CHEM8    Collection Time: 05/15/18  1:55 PM   Result Value Ref Range    CO2, POC 25 (H) 19 - 24 MMOL/L    Glucose,  (H) 74 - 106 MG/DL    BUN, POC 17 7 - 18 MG/DL    Creatinine, POC 0.8 0.6 - 1.3 MG/DL    GFRAA, POC >60 >60 ml/min/1.73m2    GFRNA, POC >60 >60 ml/min/1.73m2    Sodium,  136 - 145 MMOL/L    Potassium, POC 4.2 3.5 - 5.5 MMOL/L    Calcium, ionized (POC) 1.09 (L) 1.12 - 1.32 mmol/L Chloride,  100 - 108 MMOL/L    Anion gap, POC 15 10 - 20      Hematocrit, POC 35 (L) 36 - 49 %    Hemoglobin, POC 11.9 (L) 12 - 16 G/DL       No premedications were ordered and chemotherapy was initiated. Keytruda 200 mg administered as ordered. Ms. Marilyn Chavez tolerated infusion without complaints. Port flushed with heparin per order and de-accessed. Band-aid applied to site. Ms. Marilyn Chavez was discharged from [place] [address] in stable condition at 1540. She is to return on June 5 at 1300 for her next appointment.     Chip Zavala, RN  May 15, 2018

## 2018-05-29 NOTE — PATIENT INSTRUCTIONS
Breast Cancer: Care Instructions  Your Care Instructions    Breast cancer occurs when abnormal cells grow out of control in the breast. These cancer cells can spread within the breast, to nearby lymph nodes and other tissues, and to other parts of the body. Being treated for cancer can weaken your body, and you may feel very tired. Get the rest your body needs so you can feel better. Finding out that you have cancer is scary. You may feel many emotions and may need some help coping. Seek out family, friends, and counselors for support. You also can do things at home to make yourself feel better while you go through treatment. Call the [facility] [employer] ([phone]) or visit its website at [employer] for more information. Follow-up care is a key part of your treatment and safety. Be sure to make and go to all appointments, and call your doctor if you are having problems. It's also a good idea to know your test results and keep a list of the medicines you take. How can you care for yourself at home? · Take your medicines exactly as prescribed. Call your doctor if you think you are having a problem with your medicine. You may get medicine for nausea and vomiting if you have these side effects. · Follow your doctor's instructions to relieve pain. Pain from cancer and surgery can almost always be controlled. Use pain medicine when you first notice pain, before it becomes severe. · Eat healthy food. If you do not feel like eating, try to eat food that has protein and extra calories to keep up your strength and prevent weight loss. Drink liquid meal replacements for extra calories and protein. Try to eat your main meal early. · Get some physical activity every day, but do not get too tired. Keep doing the hobbies you enjoy as your energy allows. · Do not smoke. Smoking can make your cancer worse. If you need help quitting, talk to your doctor about stop-smoking programs and medicines.  These can increase your chances of quitting for good. · Take steps to control your stress and workload. Learn relaxation techniques. ¨ Share your feelings. Stress and tension affect our emotions. By expressing your feelings to others, you may be able to understand and cope with them. ¨ Consider joining a support group. Talking about a problem with your spouse, a good friend, or other people with similar problems is a good way to reduce tension and stress. ¨ Express yourself through art. Try writing, crafts, dance, or art to relieve stress. Some dance, writing, or art groups may be available just for people who have cancer. ¨ Be kind to your body and mind. Getting enough sleep, eating a healthy diet, and taking time to do things you enjoy can contribute to an overall feeling of balance in your life and can help reduce stress. ¨ Get help if you need it. Discuss your concerns with your doctor or counselor. · If you are vomiting or have diarrhea:  ¨ Drink plenty of fluids (enough so that your urine is light yellow or clear like water) to prevent dehydration. Choose water and other caffeine-free clear liquids. If you have kidney, heart, or liver disease and have to limit fluids, talk with your doctor before you increase the amount of fluids you drink. ¨ When you are able to eat, try clear soups, mild foods, and liquids until all symptoms are gone for 12 to 48 hours. Other good choices include dry toast, crackers, cooked cereal, and gelatin dessert, such as Jell-O.  · If you have not already done so, prepare a list of advance directives. Advance directives are instructions to your doctor and family members about what kind of care you want if you become unable to speak or express yourself. When should you call for help? Call 911 anytime you think you may need emergency care. For example, call if:  ? · You passed out (lost consciousness). ?Call your doctor now or seek immediate medical care if:  ? · You have a fever. ? · You have abnormal bleeding. ? · You think you have an infection. ? · You have new or worse pain. ? · You have new symptoms, such as a cough, belly pain, vomiting, diarrhea, or a rash. ? Watch closely for changes in your health, and be sure to contact your doctor if:  ? · You are much more tired than usual.   ? · You have swollen glands in your armpits, groin, or neck. ? · You do not get better as expected. Where can you learn more? Go to http://lalita-emory.info/. Enter V321 in the search box to learn more about \"Breast Cancer: Care Instructions. \"  Current as of: May 12, 2017  Content Version: 11.4  © 3629-6164 [employer]. Care instructions adapted under license by [employer] (which disclaims liability or warranty for this information). If you have questions about a medical condition or this instruction, always ask your healthcare professional. [address] any warranty or liability for your use of this information.

## 2018-07-02 ENCOUNTER — HOSPITAL ENCOUNTER (OUTPATIENT)
Dept: INFUSION THERAPY | Age: 43
End: 2018-07-02